# Patient Record
Sex: FEMALE | Race: WHITE | URBAN - METROPOLITAN AREA
[De-identification: names, ages, dates, MRNs, and addresses within clinical notes are randomized per-mention and may not be internally consistent; named-entity substitution may affect disease eponyms.]

---

## 2017-01-05 DIAGNOSIS — Z13.9 SCREENING FOR CONDITION: Primary | ICD-10-CM

## 2017-01-06 DIAGNOSIS — Z13.9 SCREENING FOR CONDITION: ICD-10-CM

## 2017-01-06 DIAGNOSIS — G40.909 UNCLASSIFIED EPILEPTIC SEIZURES (H): Primary | ICD-10-CM

## 2017-01-06 LAB
ALBUMIN UR-MCNC: NEGATIVE MG/DL
APPEARANCE UR: CLEAR
BASOPHILS # BLD AUTO: 0 10E9/L (ref 0–0.2)
BASOPHILS NFR BLD AUTO: 0.1 %
BILIRUB UR QL STRIP: NEGATIVE
COLOR UR AUTO: YELLOW
DIFFERENTIAL METHOD BLD: NORMAL
EOSINOPHIL # BLD AUTO: 0.1 10E9/L (ref 0–0.7)
EOSINOPHIL NFR BLD AUTO: 0.9 %
ERYTHROCYTE [DISTWIDTH] IN BLOOD BY AUTOMATED COUNT: 13.3 % (ref 10–15)
GLUCOSE UR STRIP-MCNC: NEGATIVE MG/DL
HCT VFR BLD AUTO: 36 % (ref 35–47)
HGB BLD-MCNC: 12.5 G/DL (ref 11.7–15.7)
HGB UR QL STRIP: NEGATIVE
KETONES UR STRIP-MCNC: NEGATIVE MG/DL
LEUKOCYTE ESTERASE UR QL STRIP: NEGATIVE
LYMPHOCYTES # BLD AUTO: 1.6 10E9/L (ref 0.8–5.3)
LYMPHOCYTES NFR BLD AUTO: 19.9 %
MCH RBC QN AUTO: 32.7 PG (ref 26.5–33)
MCHC RBC AUTO-ENTMCNC: 34.7 G/DL (ref 31.5–36.5)
MCV RBC AUTO: 94 FL (ref 78–100)
MONOCYTES # BLD AUTO: 0.4 10E9/L (ref 0–1.3)
MONOCYTES NFR BLD AUTO: 5.6 %
NEUTROPHILS # BLD AUTO: 5.8 10E9/L (ref 1.6–8.3)
NEUTROPHILS NFR BLD AUTO: 73.5 %
NITRATE UR QL: NEGATIVE
NON-SQ EPI CELLS #/AREA URNS LPF: NORMAL /LPF
PH UR STRIP: 7 PH (ref 5–7)
PLATELET # BLD AUTO: 235 10E9/L (ref 150–450)
RBC # BLD AUTO: 3.82 10E12/L (ref 3.8–5.2)
RBC #/AREA URNS AUTO: NORMAL /HPF (ref 0–2)
SP GR UR STRIP: 1.01 (ref 1–1.03)
URN SPEC COLLECT METH UR: NORMAL
UROBILINOGEN UR STRIP-ACNC: 0.2 EU/DL (ref 0.2–1)
WBC # BLD AUTO: 7.9 10E9/L (ref 4–11)
WBC #/AREA URNS AUTO: NORMAL /HPF (ref 0–2)

## 2017-01-06 PROCEDURE — 36415 COLL VENOUS BLD VENIPUNCTURE: CPT | Performed by: INTERNAL MEDICINE

## 2017-01-06 PROCEDURE — 86780 TREPONEMA PALLIDUM: CPT | Mod: 90 | Performed by: INTERNAL MEDICINE

## 2017-01-06 PROCEDURE — 87086 URINE CULTURE/COLONY COUNT: CPT | Performed by: INTERNAL MEDICINE

## 2017-01-06 PROCEDURE — 87340 HEPATITIS B SURFACE AG IA: CPT | Mod: 90 | Performed by: INTERNAL MEDICINE

## 2017-01-06 PROCEDURE — 85025 COMPLETE CBC W/AUTO DIFF WBC: CPT | Performed by: INTERNAL MEDICINE

## 2017-01-06 PROCEDURE — 80175 DRUG SCREEN QUAN LAMOTRIGINE: CPT | Mod: 90 | Performed by: INTERNAL MEDICINE

## 2017-01-06 PROCEDURE — 99000 SPECIMEN HANDLING OFFICE-LAB: CPT | Performed by: INTERNAL MEDICINE

## 2017-01-06 PROCEDURE — 86900 BLOOD TYPING SEROLOGIC ABO: CPT | Performed by: INTERNAL MEDICINE

## 2017-01-06 PROCEDURE — 86901 BLOOD TYPING SEROLOGIC RH(D): CPT | Performed by: INTERNAL MEDICINE

## 2017-01-06 PROCEDURE — 86850 RBC ANTIBODY SCREEN: CPT | Performed by: INTERNAL MEDICINE

## 2017-01-06 PROCEDURE — 86762 RUBELLA ANTIBODY: CPT | Mod: 90 | Performed by: INTERNAL MEDICINE

## 2017-01-06 PROCEDURE — 81001 URINALYSIS AUTO W/SCOPE: CPT | Performed by: INTERNAL MEDICINE

## 2017-01-06 PROCEDURE — 82105 ALPHA-FETOPROTEIN SERUM: CPT | Mod: 90 | Performed by: INTERNAL MEDICINE

## 2017-01-06 PROCEDURE — 87389 HIV-1 AG W/HIV-1&-2 AB AG IA: CPT | Mod: 90 | Performed by: INTERNAL MEDICINE

## 2017-01-06 PROCEDURE — 82306 VITAMIN D 25 HYDROXY: CPT | Mod: 90 | Performed by: INTERNAL MEDICINE

## 2017-01-07 LAB — T PALLIDUM IGG+IGM SER QL: NEGATIVE

## 2017-01-08 LAB
BACTERIA SPEC CULT: NORMAL
MICRO REPORT STATUS: NORMAL
SPECIMEN SOURCE: NORMAL

## 2017-01-09 LAB
# FETUSES US: NORMAL
ABO + RH BLD: NORMAL
ABO + RH BLD: NORMAL
AFP ADJ MOM AMN: 1.43
AFP SERPL-MCNC: 103 NG/ML
AGE - REPORTED: 30.9
BLD GP AB SCN SERPL QL: NORMAL
BLOOD BANK CMNT PATIENT-IMP: NORMAL
DATING METHOD: NORMAL
DEPRECATED CALCIDIOL+CALCIFEROL SERPL-MC: 30 UG/L (ref 20–75)
DIABETIC AT CONCEPTION: NO
FAMILY MEMBER DISEASES HX: NO
FAMILY MEMBER DISEASES HX: NO
GA METHOD: NORMAL
GA: 20.57 WK
HBV SURFACE AG SERPL QL IA: NONREACTIVE
HIV 1+2 AB+HIV1 P24 AG SERPL QL IA: NORMAL
HX OF HEREDITARY DISORDERS: NO
IDDM PATIENT QL: NO
INTEGRATED SCN PATIENT-IMP: NORMAL
LMP START DATE: NORMAL
PREV HX CHROMOSOME ABNORMALITY: NO
SPECIMEN DRAWN SERPL: NORMAL
SPECIMEN EXP DATE BLD: NORMAL
TWINS: NO

## 2017-01-10 LAB
LAMOTRIGINE SERPL-MCNC: 1.6 UG/ML
RUBV IGG SERPL IA-ACNC: 36 IU/ML

## 2017-01-11 ENCOUNTER — PRE VISIT (OUTPATIENT)
Dept: MATERNAL FETAL MEDICINE | Facility: CLINIC | Age: 32
End: 2017-01-11

## 2017-01-16 ENCOUNTER — OFFICE VISIT (OUTPATIENT)
Dept: OBGYN | Facility: CLINIC | Age: 32
End: 2017-01-16
Attending: OBSTETRICS & GYNECOLOGY
Payer: COMMERCIAL

## 2017-01-16 ENCOUNTER — HOSPITAL ENCOUNTER (OUTPATIENT)
Dept: ULTRASOUND IMAGING | Facility: CLINIC | Age: 32
Discharge: HOME | End: 2017-01-16
Attending: OBSTETRICS & GYNECOLOGY | Admitting: OBSTETRICS & GYNECOLOGY
Payer: COMMERCIAL

## 2017-01-16 ENCOUNTER — OFFICE VISIT (OUTPATIENT)
Dept: MATERNAL FETAL MEDICINE | Facility: CLINIC | Age: 32
End: 2017-01-16
Attending: OBSTETRICS & GYNECOLOGY
Payer: COMMERCIAL

## 2017-01-16 VITALS
WEIGHT: 148 LBS | HEART RATE: 80 BPM | SYSTOLIC BLOOD PRESSURE: 95 MMHG | DIASTOLIC BLOOD PRESSURE: 66 MMHG | BODY MASS INDEX: 23.9 KG/M2

## 2017-01-16 DIAGNOSIS — O26.90 PREGNANCY RELATED CONDITION, UNSPECIFIED TRIMESTER: ICD-10-CM

## 2017-01-16 DIAGNOSIS — O09.622 HIGH RISK PREGNANCY IN YOUNG MULTIGRAVIDA, SECOND TRIMESTER: Primary | ICD-10-CM

## 2017-01-16 DIAGNOSIS — O99.352 SEIZURE DISORDER DURING PREGNANCY IN SECOND TRIMESTER (H): Primary | ICD-10-CM

## 2017-01-16 DIAGNOSIS — G40.909 SEIZURE DISORDER DURING PREGNANCY IN SECOND TRIMESTER (H): Primary | ICD-10-CM

## 2017-01-16 PROCEDURE — 99212 OFFICE O/P EST SF 10 MIN: CPT | Mod: ZF

## 2017-01-16 PROCEDURE — 76811 OB US DETAILED SNGL FETUS: CPT

## 2017-01-16 NOTE — PROGRESS NOTES
"Please see \"Imaging\" tab under \"Chart Review\" for details of today's US.    Hernesto Cristina    "

## 2017-01-16 NOTE — NURSING NOTE
Chief Complaint   Patient presents with     Prenatal Care     22w0d       See ADEEL Farnsworth 1/16/2017

## 2017-01-16 NOTE — Clinical Note
2017       RE: Jill Regan  [address]  [facility] [address]     Dear Colleague,    Thank you for referring your patient, Jill Regan, to the [facility] at [facility]. Please see a copy of my visit note below.    Doing well. U/s today reassuring at [facility].   Reviewed OB labs and all wnl.      Has seizure d/o on meds and levels were recently checked by neuro team- she has not heard re: any changes to dose.   No recent seizures. Denies HA.     A/p: 30 yo  at 22 wks, w/ seizure d/o on 2 meds.   1. F/u us scheduled per [facility] team recs w/ growth at 28 and 34 wks GA.   2. F/u with neuro team w/ med levels regularly and post partum as well   3. Folic acid 2000 BID   4. S/p flu shot  5. GCT/cbc and RPR at next visit.     Lubna Rajput MD, FACOG  [facility]  OB/GYN    2017  9:47 AM

## 2017-01-18 PROBLEM — G40.909 SEIZURE DISORDER (H): Status: ACTIVE | Noted: 2017-01-18

## 2017-01-18 NOTE — PROGRESS NOTES
Doing well. U/s today reassuring at [facility].   Reviewed OB labs and all wnl.      Has seizure d/o on meds and levels were recently checked by neuro team- she has not heard re: any changes to dose.   No recent seizures. Denies HA.     A/p: 30 yo  at 22 wks, w/ seizure d/o on 2 meds.   1. F/u us scheduled per [facility] team recs w/ growth at 28 and 34 wks GA.   2. F/u with neuro team w/ med levels regularly and post partum as well   3. Folic acid 2000 BID   4. S/p flu shot  5. GCT/cbc and RPR at next visit.     Lubna Rajput MD, FACOG  [facility]  OB/GYN    2017  9:47 AM

## 2017-02-27 ENCOUNTER — OFFICE VISIT (OUTPATIENT)
Dept: OBGYN | Facility: CLINIC | Age: 32
End: 2017-02-27
Attending: OBSTETRICS & GYNECOLOGY
Payer: COMMERCIAL

## 2017-02-27 VITALS
DIASTOLIC BLOOD PRESSURE: 69 MMHG | SYSTOLIC BLOOD PRESSURE: 108 MMHG | BODY MASS INDEX: 24.97 KG/M2 | HEART RATE: 91 BPM | WEIGHT: 154.7 LBS

## 2017-02-27 DIAGNOSIS — Z32.01 PREGNANCY TEST POSITIVE: ICD-10-CM

## 2017-02-27 DIAGNOSIS — O09.93 HRP (HIGH RISK PREGNANCY), THIRD TRIMESTER: Primary | ICD-10-CM

## 2017-02-27 PROCEDURE — 99212 OFFICE O/P EST SF 10 MIN: CPT | Mod: ZF

## 2017-02-27 ASSESSMENT — PAIN SCALES - GENERAL: PAINLEVEL: NO PAIN (0)

## 2017-02-27 NOTE — MR AVS SNAPSHOT
After Visit Summary   2/27/2017    Jill Regan    MRN: [id number]           Patient Information     Date Of Birth          1985        Visit Information        Provider Department      2/27/2017 3:45 PM Lubna Rajput MD [facility]        Today's Diagnoses     HRP (high risk pregnancy), third trimester    -  1    Pregnancy test positive           Follow-ups after your visit        Follow-up notes from your care team     Return in about 2 weeks (around 3/13/2017) for ANA MARÍA.      Future tests that were ordered for you today     Open Future Orders        Priority Expected Expires Ordered    Growth Ultrasound 84026 Routine 5/29/2017 6/28/2017 2/28/2017    25- OH-Vitamin D Routine  2/27/2018 2/27/2017    CBC with Platelets Routine  2/27/2018 2/27/2017    Anti Treponema Routine  2/27/2018 2/27/2017    Glucose 1 Hour Routine  2/27/2018 2/27/2017            Who to contact     Please call your clinic at [phone] to:    Ask questions about your health    Make or cancel appointments    Discuss your medicines    Learn about your test results    Speak to your doctor   If you have compliments or concerns about an experience at your clinic, or if you wish to file a complaint, please contact [facility] Patient Relations at [phone] or email us at [email or web address]         Additional Information About Your Visit        MyChart Information     [employer] is an electronic gateway that provides easy, online access to your medical records. With eSolar, you can request a clinic appointment, read your test results, renew a prescription or communicate with your care team.     To sign up for [employer] visit the website at [email or web address]   You will be asked to enter the access code listed below, as well as some personal information. Please follow the directions to create your username and password.     Your access code is:  [email or web address]  Expires: 2017 11:31 AM     Your access code will  in 90 days. If you need help or a new code, please contact your [facility] or call [phone] for assistance.        Care EveryWhere ID     This is your Care EveryWhere ID. This could be used by other organizations to access your [place] medical records  [id number]        Your Vitals Were     Pulse Breastfeeding? BMI (Body Mass Index)             91 No 24.97 kg/m2          Blood Pressure from Last 3 Encounters:   17 108/69   17 95/66   16 108/73    Weight from Last 3 Encounters:   17 70.2 kg (154 lb 11.2 oz)   17 67.1 kg (148 lb)   16 63.6 kg (140 lb 3.2 oz)               Primary Care Provider Office Phone # Fax #    Jelena Paiz [phone]       [place] [address]        Thank you!     Thank you for choosing [facility]  for your care. Our goal is always to provide you with excellent care. Hearing back from our patients is one way we can continue to improve our services. Please take a few minutes to complete the written survey that you may receive in the mail after your visit with us. Thank you!             Your Updated Medication List - Protect others around you: Learn how to safely use, store and throw away your medicines at www.disposemymeds.org.          This list is accurate as of: 17 11:59 PM.  Always use your most recent med list.                   Brand Name Dispense Instructions for use    FOLIC ACID PO      Take 2,000 mcg by mouth 2 times daily       KEPPRA PO      Take 1,500 mg by mouth 2 times daily       LAMICTAL PO      Take 100 mg by mouth 2 times daily       PRENATAL VITAMIN PO

## 2017-02-27 NOTE — LETTER
2017       RE: Jill Regan  [address]  [facility] [address]     Dear Colleague,    Thank you for referring your patient, Jill Regan, to the [facility] at [facility]. Please see a copy of my visit note below.    S: Jill Regan is a 30yo  at 28w0d presenting for OB follow-up visit. Feels things are going well, though notes continued struggles with dry skin on her face as well as varicose veins involving the left vulva and upper leg. Does note starting a new face wash a few weeks ago when rash emerged.    Is continuing to wear pressure tights for varicose veins and using pregnancy pillow to try to reduce pain. Otherwise no concerns, feels well, denies contractions, loss of fluid or vaginal bleeding, headache, vision changes, nausea or vomiting, swelling, RUQ pain, SOB.     Denies any seizure symptoms, feels well controlled on current regimen - at recent visit with neurology noted levels were on low side but they were happy with them, regimen was not changed.    Notes she delivered both her children at around 38 weeks - the first she went into labor but was augmented, and the second was induced. States this was to help maintain a consistent sleep schedule for management of her seizure disorder. Is hoping to do the same with this pregnancy.    O: /69, P 91, weight 70.2kg. Well appearing. RRR with normal S1 and S2, lungs CTAB. Gravid abdomen, size consistent with 28w gestation. Skin of face including both cheeks and some forehead pink with areas of dry-appearing, peeling skin. Varicose veins noted involving left vulva and left upper thigh.     A/P: 30 yo  at 28 wks. H/o seizure disorder on 2 meds.     Growth u/s rec'd per MFM 28 and 34 wks.  Pt still needs to scheduled 28 wk scan.   Obtain third trimester labs including GCT in the next 1-2 weeks- as she is unable to complete today.  Discussed options for facial  dryness and irritation including Vanicream, coconut oil, aloe.   Continue use of pressure tights, instructed not to shave or wax over varicosities- as likely will increase irritation  IOL scheduled on 5/12/17 at 38+4 to help plan for early morning start of IOL and to help minimize sleep disruption that triggers her seizures.       Note prepared by Danuta Kuo, MS4, under the supervision of Dr. Lubna Rajput MD.    I agree with the PFSH and ROS as completed by the MS, except for changes made by me. The remainder of the encounter was performed by me and scribed by the MS. The scribed note accurately reflects my personal services and the decisions made by me.    Lubna Rajput MD, FACOG  [facility]  OB/GYN    2/28/2017  10:49 AM

## 2017-02-27 NOTE — PROGRESS NOTES
S: Jill Regan is a 30yo  at 28w0d presenting for OB follow-up visit. Feels things are going well, though notes continued struggles with dry skin on her face as well as varicose veins involving the left vulva and upper leg. Does note starting a new face wash a few weeks ago when rash emerged.    Is continuing to wear pressure tights for varicose veins and using pregnancy pillow to try to reduce pain. Otherwise no concerns, feels well, denies contractions, loss of fluid or vaginal bleeding, headache, vision changes, nausea or vomiting, swelling, RUQ pain, SOB.     Denies any seizure symptoms, feels well controlled on current regimen - at recent visit with neurology noted levels were on low side but they were happy with them, regimen was not changed.    Notes she delivered both her children at around 38 weeks - the first she went into labor but was augmented, and the second was induced. States this was to help maintain a consistent sleep schedule for management of her seizure disorder. Is hoping to do the same with this pregnancy.    O: /69, P 91, weight 70.2kg. Well appearing. RRR with normal S1 and S2, lungs CTAB. Gravid abdomen, size consistent with 28w gestation. Skin of face including both cheeks and some forehead pink with areas of dry-appearing, peeling skin. Varicose veins noted involving left vulva and left upper thigh.     A/P: 32 yo  at 28 wks. H/o seizure disorder on 2 meds.     Growth u/s rec'd per MFM 28 and 34 wks.  Pt still needs to scheduled 28 wk scan.   Obtain third trimester labs including GCT in the next 1-2 weeks- as she is unable to complete today.  Discussed options for facial dryness and irritation including Vanicream, coconut oil, aloe.   Continue use of pressure tights, instructed not to shave or wax over varicosities- as likely will increase irritation  IOL scheduled on 17 at 38+4 to help plan for early morning start of IOL and to help minimize sleep disruption  that triggers her seizures.       Note prepared by Danuta Kuo, MS4, under the supervision of Dr. Lubna Rajput MD.    I agree with the PFSH and ROS as completed by the MS, except for changes made by me. The remainder of the encounter was performed by me and scribed by the MS. The scribed note accurately reflects my personal services and the decisions made by me.    Lubna Rajput MD, FACOG  [facility]  OB/GYN    2/28/2017  10:49 AM

## 2017-03-09 ENCOUNTER — OFFICE VISIT (OUTPATIENT)
Dept: OBGYN | Facility: CLINIC | Age: 32
End: 2017-03-09
Attending: OBSTETRICS & GYNECOLOGY
Payer: COMMERCIAL

## 2017-03-09 DIAGNOSIS — Z32.01 PREGNANCY TEST POSITIVE: ICD-10-CM

## 2017-03-09 DIAGNOSIS — O09.93 HRP (HIGH RISK PREGNANCY), THIRD TRIMESTER: ICD-10-CM

## 2017-03-09 LAB
DEPRECATED CALCIDIOL+CALCIFEROL SERPL-MC: 36 UG/L (ref 20–75)
ERYTHROCYTE [DISTWIDTH] IN BLOOD BY AUTOMATED COUNT: 13.2 % (ref 10–15)
GLUCOSE 1H P 50 G GLC PO SERPL-MCNC: 76 MG/DL (ref 60–129)
HCT VFR BLD AUTO: 35.5 % (ref 35–47)
HGB BLD-MCNC: 11.9 G/DL (ref 11.7–15.7)
MCH RBC QN AUTO: 32.2 PG (ref 26.5–33)
MCHC RBC AUTO-ENTMCNC: 33.5 G/DL (ref 31.5–36.5)
MCV RBC AUTO: 96 FL (ref 78–100)
PLATELET # BLD AUTO: 203 10E9/L (ref 150–450)
RBC # BLD AUTO: 3.7 10E12/L (ref 3.8–5.2)
WBC # BLD AUTO: 8.6 10E9/L (ref 4–11)

## 2017-03-09 PROCEDURE — 82306 VITAMIN D 25 HYDROXY: CPT | Performed by: OBSTETRICS & GYNECOLOGY

## 2017-03-09 PROCEDURE — 36415 COLL VENOUS BLD VENIPUNCTURE: CPT | Performed by: OBSTETRICS & GYNECOLOGY

## 2017-03-09 PROCEDURE — 76816 OB US FOLLOW-UP PER FETUS: CPT | Mod: ZF

## 2017-03-09 PROCEDURE — 85027 COMPLETE CBC AUTOMATED: CPT | Performed by: OBSTETRICS & GYNECOLOGY

## 2017-03-09 PROCEDURE — 82950 GLUCOSE TEST: CPT | Performed by: OBSTETRICS & GYNECOLOGY

## 2017-03-09 PROCEDURE — 86780 TREPONEMA PALLIDUM: CPT | Performed by: OBSTETRICS & GYNECOLOGY

## 2017-03-09 NOTE — LETTER
3/9/2017       RE: Jill Regan  [address] [facility] [address]     Dear Colleague,    Thank you for referring your patient, Jill Regan, to the [facility] at [facility]. Please see a copy of my visit note below.    31 year old female, , presents at 29 2/7 weeks for obstetric ultrasound assessment indicated by h/o seizure disorder.    Single fetus    Presentation - cephalic    USEGA = 29 6/7 weeks.  EFW = 1,516 grams, 63 % for 29 weeks.      YARITZA = 11.7cm.  FHR = 138bpm     Placenta anterior and grade 0    Comments: Appropriate interval growth    Findings discussed with patient.    Further studies as clinically indicated.      MIRTA Wallace MD

## 2017-03-09 NOTE — MR AVS SNAPSHOT
After Visit Summary   3/9/2017    Jill Regan    MRN: [id number]           Patient Information     Date Of Birth          1985        Visit Information        Provider Department      3/9/2017 10:30 AM [facility] ULTRASOUND II [facility]        Today's Diagnoses     HRP (high risk pregnancy), third trimester           Follow-ups after your visit        Your next 10 appointments already scheduled     2017 10:30 AM CDT   ULTRASOUND with [facility] ULTRASOUND II   [facility] ([facility])    [facility] [address]  [address]  [facility] [id number] [phone]              Who to contact     Please call your clinic at [phone] to:    Ask questions about your health    Make or cancel appointments    Discuss your medicines    Learn about your test results    Speak to your doctor   If you have compliments or concerns about an experience at your clinic, or if you wish to file a complaint, please contact [facility] Patient Relations at [phone] or email us at [email or web address]         Additional Information About Your Visit        MyChart Information     Vimodi is an electronic gateway that provides easy, online access to your medical records. With Vimodi, you can request a clinic appointment, read your test results, renew a prescription or communicate with your care team.     To sign up for Vimodi visit the website at [email or web address]   You will be asked to enter the access code listed below, as well as some personal information. Please follow the directions to create your username and password.     Your access code is: [email or web address]  Expires: 2017 11:31 AM     Your access code will  in 90 days. If you need help or a new code, please contact your [facility] or call [phone] for assistance.        Care  EveryWhere ID     This is your Care EveryWhere ID. This could be used by other organizations to access your [place] medical records  [id number]         Blood Pressure from Last 3 Encounters:   02/27/17 108/69   01/16/17 95/66   12/09/16 108/73    Weight from Last 3 Encounters:   02/27/17 70.2 kg (154 lb 11.2 oz)   01/16/17 67.1 kg (148 lb)   12/09/16 63.6 kg (140 lb 3.2 oz)              We Performed the Following     Growth Ultrasound [id number]        Primary Care Provider Office Phone # Fax #    Jelena Paiz [phone]       [facility] [address] [facility] [facility] [address]        Thank you!     Thank you for choosing [facility]  for your care. Our goal is always to provide you with excellent care. Hearing back from our patients is one way we can continue to improve our services. Please take a few minutes to complete the written survey that you may receive in the mail after your visit with us. Thank you!             Your Updated Medication List - Protect others around you: Learn how to safely use, store and throw away your medicines at www.disposemymeds.org.          This list is accurate as of: 3/9/17  4:27 PM.  Always use your most recent med list.                   Brand Name Dispense Instructions for use    FOLIC ACID PO      Take 2,000 mcg by mouth 2 times daily       KEPPRA PO      Take 1,500 mg by mouth 2 times daily       LAMICTAL PO      Take 100 mg by mouth 2 times daily       PRENATAL VITAMIN PO

## 2017-03-10 LAB — T PALLIDUM IGG+IGM SER QL: NEGATIVE

## 2017-04-13 ENCOUNTER — OFFICE VISIT (OUTPATIENT)
Dept: OBGYN | Facility: CLINIC | Age: 32
End: 2017-04-13
Attending: OBSTETRICS & GYNECOLOGY
Payer: COMMERCIAL

## 2017-04-13 DIAGNOSIS — G40.909 SEIZURE DISORDER (H): Primary | ICD-10-CM

## 2017-04-13 PROCEDURE — 76816 OB US FOLLOW-UP PER FETUS: CPT | Mod: ZF

## 2017-04-13 NOTE — MR AVS SNAPSHOT
After Visit Summary   4/13/2017    Jill Regan    MRN: [id number]           Patient Information     Date Of Birth          1985        Visit Information        Provider Department      4/13/2017 10:30 AM [facility] ULTRASOUND II [facility]        Today's Diagnoses     Seizure disorder (H)    -  1       Follow-ups after your visit        Your next 10 appointments already scheduled     Apr 20, 2017 10:45 AM CDT   RETURN OB with CARY Richardson CN   [facility] ([facility])    [facility] [address]  [address]  [facility] [address] [phone]            Apr 26, 2017 10:00 AM CDT   RETURN OB with CARY Houston   [facility] ([facility])    [facility] [address]  [address]  [facility] [address] [phone]            May 02, 2017 10:30 AM CDT   RETURN OB with Ana Paula Hooks MD   [facility] ([facility])    [facility] [address]  [address]  [facility] [address] [phone]            May 11, 2017  2:00 PM CDT   RETURN OB with Angie Pedraza MD   [facility] ([facility])    [facility] [address]  [address]  [facility] [id number] [phone]              Who to contact     Please call your clinic at [phone] to:    Ask questions about your health    Make or cancel appointments    Discuss your medicines    Learn about your test results    Speak to your doctor   If you have compliments or concerns about an experience at your clinic, or if you wish to file a complaint, please contact [facility] Patient Relations at [phone] or email us at [email or web address]         Additional Information About Your Visit        MyChart Information      HealthW[employer]house[employer] is an electronic gateway that provides easy, online access to your medical records. With [email or web address], you can request a clinic appointment, read your test results, renew a prescription or communicate with your care team.     To sign up for [email or web address] visit the website at [email or web address]   You will be asked to enter the access code listed below, as well as some personal information. Please follow the directions to create your username and password.     Your access code is: [email or web address]  Expires: 2017 12:31 PM     Your access code will  in 90 days. If you need help or a new code, please contact your [facility] or call [phone] for assistance.        Care EveryWhere ID     This is your Care EveryWhere ID. This could be used by other organizations to access your [place] medical records  [id number]         Blood Pressure from Last 3 Encounters:   17 108/69   17 95/66   16 108/73    Weight from Last 3 Encounters:   17 70.2 kg (154 lb 11.2 oz)   17 67.1 kg (148 lb)   16 63.6 kg (140 lb 3.2 oz)              We Performed the Following     Growth Ultrasound [address]        Primary Care Provider Office Phone # Fax #    Jelena Paiz [phone]       [place] [address] [address] [address]        Thank you!     Thank you for choosing [facility]  for your care. Our goal is always to provide you with excellent care. Hearing back from our patients is one way we can continue to improve our services. Please take a few minutes to complete the written survey that you may receive in the mail after your visit with us. Thank you!             Your Updated Medication List - Protect others around you: Learn how to safely use, store and throw away your medicines at www.disposemymeds.org.          This list is accurate as of: 17 11:26 AM.  Always use your most recent med list.                    Brand Name Dispense Instructions for use    FOLIC ACID PO      Take 2,000 mcg by mouth 2 times daily       KEPPRA PO      Take 1,500 mg by mouth 2 times daily       LAMICTAL PO      Take 100 mg by mouth 2 times daily       PRENATAL VITAMIN PO

## 2017-04-13 NOTE — LETTER
2017       RE: Jill Regan  [address] [facility] [address]     Dear Colleague,    Thank you for referring your patient, Jill Regan, to the [facility] at [facility]. Please see a copy of my visit note below.    31 year old female, ,  presents at 34 3/7 weeks for obstetric ultrasound assessment indicated by h/o seizure disorder.    Single fetus    Presentation - cephalic    USEGA = 34 1/7 weeks.  EFW = 2,354 grams, 35 % for 34 weeks.      YARITZA = 9.7cm.  FHR = 135bpm     Placenta anterior and grade 0    Comments: Appropriate growth for gestational age    Findings discussed with patient.    Further studies as clinically indicated.    MIRTA Wallace MD

## 2017-04-20 ENCOUNTER — OFFICE VISIT (OUTPATIENT)
Dept: OBGYN | Facility: CLINIC | Age: 32
End: 2017-04-20
Attending: ADVANCED PRACTICE MIDWIFE
Payer: COMMERCIAL

## 2017-04-20 VITALS
WEIGHT: 159 LBS | SYSTOLIC BLOOD PRESSURE: 101 MMHG | DIASTOLIC BLOOD PRESSURE: 65 MMHG | HEIGHT: 66 IN | HEART RATE: 90 BPM | BODY MASS INDEX: 25.55 KG/M2

## 2017-04-20 DIAGNOSIS — G40.909 SEIZURE DISORDER (H): ICD-10-CM

## 2017-04-20 DIAGNOSIS — O09.93 HRP (HIGH RISK PREGNANCY), THIRD TRIMESTER: Primary | ICD-10-CM

## 2017-04-20 PROCEDURE — 99211 OFF/OP EST MAY X REQ PHY/QHP: CPT | Mod: ZF

## 2017-04-20 PROCEDURE — 87653 STREP B DNA AMP PROBE: CPT | Performed by: ADVANCED PRACTICE MIDWIFE

## 2017-04-20 ASSESSMENT — PAIN SCALES - GENERAL: PAINLEVEL: NO PAIN (0)

## 2017-04-20 NOTE — LETTER
"2017       RE: Jill Regan  [address] [facility] [address]     Dear Colleague,    Thank you for referring your patient, Jill Regan, to the [facility] at [facility]. Please see a copy of my visit note below.    Subjective:      31 year old  at 35w5d presents for a routine prenatal appointment.         no vaginal bleeding,  leakage of fluid, or change in vaginal discharge.  no contractions.  pos fetal movement.     No HA, visual changes, RUQ or epigastric pain.   Patient concerns: none, wanting to make sure IOL is scheduled .   Feeling well overall.   Objective:  Vitals:    17 1105   BP: 101/65   BP Location: Left arm   Patient Position: Chair   Cuff Size: Adult Regular   Pulse: 90   Weight: 72.1 kg (159 lb)   Height: 1.676 m (5' 5.98\")    See OB flowsheet    Assessment/Plan     Encounter Diagnoses   Name Primary?     HRP (high risk pregnancy), third trimester Yes     Seizure disorder (H)      No orders of the defined types were placed in this encounter.    No orders of the defined types were placed in this encounter.      No flowsheet data found.  Growth u/s is reviewed, 34% EFW, appropriate growth per report  GBS screening: Obtained.  Birth preferences reviewed: Medicated  Labor signs discussed. Reinforced daily fetal movement counts.  Reviewed why/how to contact provider if headache/visual changes/RUQ or epigastric pain, decreased fetal movement, vaginal bleeding, leakage of fluid.   Return to clinic in 1 week and prn if questions or concerns.     Heather Singleton, APRN CNM        "

## 2017-04-20 NOTE — MR AVS SNAPSHOT
After Visit Summary   4/20/2017    Jill Regan    MRN: [id number]           Patient Information     Date Of Birth          1985        Visit Information        Provider Department      4/20/2017 10:45 AM Heather Singleton APRN [facility]        Today's Diagnoses     HRP (high risk pregnancy), third trimester    -  1    Seizure disorder (H)           Follow-ups after your visit        Follow-up notes from your care team     Return in about 1 week (around 4/27/2017) for bubba.      Your next 10 appointments already scheduled     Apr 26, 2017 10:00 AM CDT   RETURN OB with CARY Houston CNM   [facility] ([facility])    [facility] [facility]  [address]  [facility] [id number] [phone]            May 02, 2017 10:30 AM CDT   RETURN OB with Ana Paula Hooks MD   [facility] ([facility])    [facility] [facility] [address]  [address] [facility] [id number] [phone]            May 11, 2017  2:00 PM CDT   RETURN OB with Angie Pedraza MD   [facility] ([facility])    [place] [facility] [address]  [address]  [facility] [id number] [phone]              Who to contact     Please call your clinic at [phone] to:    Ask questions about your health    Make or cancel appointments    Discuss your medicines    Learn about your test results    Speak to your doctor   If you have compliments or concerns about an experience at your clinic, or if you wish to file a complaint, please contact [facility] Patient Relations at [phone] or email us at [email or web address]         Additional Information About Your Visit        MyChart Information     Spotwiset is an electronic gateway that provides easy, online access to your medical records.  "With Mtone Wirelesshart, you can request a clinic appointment, read your test results, renew a prescription or communicate with your care team.     To sign up for [employer] visit the website at [email or web address]   You will be asked to enter the access code listed below, as well as some personal information. Please follow the directions to create your username and password.     Your access code is: [email or web address]  Expires: 2017 12:31 PM     Your access code will  in 90 days. If you need help or a new code, please contact your [facility] or call [phone] for assistance.        Care EveryWhere ID     This is your Care EveryWhere ID. This could be used by other organizations to access your [place] medical records  [id number]        Your Vitals Were     Pulse Height BMI (Body Mass Index)             90 1.676 m (5' 5.98\") 25.68 kg/m2          Blood Pressure from Last 3 Encounters:   17 101/65   17 108/69   17 95/66    Weight from Last 3 Encounters:   17 72.1 kg (159 lb)   17 70.2 kg (154 lb 11.2 oz)   17 67.1 kg (148 lb)              We Performed the Following     Group B strep PCR        Primary Care Provider Office Phone # Fax #    Jelena Paiz [phone]       [place] [address] [address] [address]        Thank you!     Thank you for choosing [facility]  for your care. Our goal is always to provide you with excellent care. Hearing back from our patients is one way we can continue to improve our services. Please take a few minutes to complete the written survey that you may receive in the mail after your visit with us. Thank you!             Your Updated Medication List - Protect others around you: Learn how to safely use, store and throw away your medicines at www.disposemymeds.org.          This list is accurate as of: 17 11:59 PM.  Always use your most recent med list.       "             Brand Name Dispense Instructions for use    FOLIC ACID PO      Take 2,000 mcg by mouth 2 times daily       KEPPRA PO      Take 1,500 mg by mouth 2 times daily       LAMICTAL PO      Take 100 mg by mouth 2 times daily       PRENATAL VITAMIN PO

## 2017-04-21 LAB
GP B STREP DNA SPEC QL NAA+PROBE: NORMAL
SPECIMEN SOURCE: NORMAL

## 2017-04-22 NOTE — PROGRESS NOTES
"Subjective:      31 year old  at 35w5d presents for a routine prenatal appointment.         no vaginal bleeding,  leakage of fluid, or change in vaginal discharge.  no contractions.  pos fetal movement.     No HA, visual changes, RUQ or epigastric pain.   Patient concerns: none, wanting to make sure IOL is scheduled .   Feeling well overall.   Objective:  Vitals:    17 1105   BP: 101/65   BP Location: Left arm   Patient Position: Chair   Cuff Size: Adult Regular   Pulse: 90   Weight: 72.1 kg (159 lb)   Height: 1.676 m (5' 5.98\")    See OB flowsheet    Assessment/Plan     Encounter Diagnoses   Name Primary?     HRP (high risk pregnancy), third trimester Yes     Seizure disorder (H)      No orders of the defined types were placed in this encounter.    No orders of the defined types were placed in this encounter.      No flowsheet data found.  Growth u/s is reviewed, 34% EFW, appropriate growth per report  GBS screening: Obtained.  Birth preferences reviewed: Medicated  Labor signs discussed. Reinforced daily fetal movement counts.  Reviewed why/how to contact provider if headache/visual changes/RUQ or epigastric pain, decreased fetal movement, vaginal bleeding, leakage of fluid.   Return to clinic in 1 week and prn if questions or concerns.     Heather Singleton, APRN CNM  "

## 2017-04-23 ENCOUNTER — TELEPHONE (OUTPATIENT)
Dept: NURSING | Facility: CLINIC | Age: 32
End: 2017-04-23

## 2017-04-23 NOTE — TELEPHONE ENCOUNTER
"Call Type: Triage Call    Presenting Problem: \" I  am 35 weeks pregnant,  I was having  contractionsearlier in the week, but now I was  just sitting  down,then felt some wetness and it was moist, I don't know if my  water broke or not. Last night I had contractions again for 4 hours,  but didn't time them, because they weren't close enough together. I  have 2 other kids. \" Paged Dr. Medel for [facility]  through page  at 1:20pm directly to patient at [phone].    Triage Note:  Guideline Title: Pregnancy:  Labor, 20 to 37 Weeks  Recommended Disposition: Call Provider Immediately  Original Inclination: Wanted to speak with a nurse  Override Disposition:  Intended Action: Call PCP/HCP  Physician Contacted: No  Sudden gush or trickle of fluid from the vagina ?  YES  New or worsening signs and symptoms that may indicate shock ? NO  Gestation more than 37 weeks AND signs of labor ? NO  Gestation less than 20 weeks AND signs of labor ? NO  Feeling of baby coming or wanting to push (urge to bear down) ? NO  Unbearable abdominal/pelvic pain ? NO  Umbilical cord or any part of the baby (head, bottom, arm or leg) at the opening  of the vagina ? NO  Gush or leakage of green or green-tinged or port-wine colored fluid (reddish and  watery) from the vagina ? NO  No relief between contractions ? NO  Continuous bright red vaginal bleeding for more than 15 minutes (more than  spotting) ? NO  Gestation 20 weeks or more AND decreased fetal movement compared to previous  activity ? NO  Physician Instructions:  Care Advice: IMMEDIATE ACTION  "

## 2017-04-24 ENCOUNTER — TRANSFERRED RECORDS (OUTPATIENT)
Dept: HEALTH INFORMATION MANAGEMENT | Facility: CLINIC | Age: 32
End: 2017-04-24

## 2017-05-02 ENCOUNTER — OFFICE VISIT (OUTPATIENT)
Dept: OBGYN | Facility: CLINIC | Age: 32
End: 2017-05-02
Attending: OBSTETRICS & GYNECOLOGY
Payer: COMMERCIAL

## 2017-05-02 VITALS
HEIGHT: 66 IN | BODY MASS INDEX: 25.12 KG/M2 | WEIGHT: 156.3 LBS | SYSTOLIC BLOOD PRESSURE: 100 MMHG | HEART RATE: 90 BPM | DIASTOLIC BLOOD PRESSURE: 62 MMHG

## 2017-05-02 DIAGNOSIS — O09.93 HRP (HIGH RISK PREGNANCY), THIRD TRIMESTER: Primary | ICD-10-CM

## 2017-05-02 ASSESSMENT — PAIN SCALES - GENERAL: PAINLEVEL: NO PAIN (0)
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
## 2017-05-02 NOTE — NURSING NOTE
Chief Complaint   Patient presents with     Prenatal Care     ANA MARÍA 37 weeks and1 day   Keeley Snyder LPN

## 2017-05-02 NOTE — MR AVS SNAPSHOT
After Visit Summary   2017    Jill Regan    MRN: [id number]           Patient Information     Date Of Birth          1985        Visit Information        Provider Department      2017 10:30 AM Ana Paula Hooks MD [facility]        Today's Diagnoses     HRP (high risk pregnancy), third trimester    -  1       Follow-ups after your visit        Follow-up notes from your care team     Return in about 1 week (around 2017).      Your next 10 appointments already scheduled     May 11, 2017  2:00 PM CDT   RETURN OB with Angie Pedraza MD   [facility] ([facility])    [place] [facility] [address]  [address]  [address]  [facility] [address] [phone]              Who to contact     Please call your clinic at [phone] to:    Ask questions about your health    Make or cancel appointments    Discuss your medicines    Learn about your test results    Speak to your doctor   If you have compliments or concerns about an experience at your clinic, or if you wish to file a complaint, please contact [facility] Patient Relations at [phone] or email us at [email or web address]         Additional Information About Your Visit        MyChart Information     Integrated Ordering Systemst is an electronic gateway that provides easy, online access to your medical records. With fromAtoB, you can request a clinic appointment, read your test results, renew a prescription or communicate with your care team.     To sign up for Integrated Ordering Systemst visit the website at [email or web address]   You will be asked to enter the access code listed below, as well as some personal information. Please follow the directions to create your username and password.     Your access code is: [email or web address]  Expires: 2017 12:31 PM     Your access code will  in 90 days. If you need help or a new code, please contact your  "[facility] or call [phone] for assistance.        Care EveryWhere ID     This is your Care EveryWhere ID. This could be used by other organizations to access your [place] medical records  [id number]        Your Vitals Were     Pulse Height Breastfeeding? BMI (Body Mass Index)          90 1.676 m (5' 6\") No 25.23 kg/m2         Blood Pressure from Last 3 Encounters:   05/02/17 100/62   04/20/17 101/65   02/27/17 108/69    Weight from Last 3 Encounters:   05/02/17 70.9 kg (156 lb 4.8 oz)   04/20/17 72.1 kg (159 lb)   02/27/17 70.2 kg (154 lb 11.2 oz)              Today, you had the following     No orders found for display       Primary Care Provider Office Phone # Fax #    Jelena Paiz [phone]       [place] [address] [address] [address]        Thank you!     Thank you for choosing [facility]  for your care. Our goal is always to provide you with excellent care. Hearing back from our patients is one way we can continue to improve our services. Please take a few minutes to complete the written survey that you may receive in the mail after your visit with us. Thank you!             Your Updated Medication List - Protect others around you: Learn how to safely use, store and throw away your medicines at www.disposemymeds.org.          This list is accurate as of: 5/2/17  4:04 PM.  Always use your most recent med list.                   Brand Name Dispense Instructions for use    FOLIC ACID PO      Take 2,000 mcg by mouth 2 times daily       KEPPRA PO      Take 1,500 mg by mouth 2 times daily       LAMICTAL PO      Take 100 mg by mouth 2 times daily       PRENATAL VITAMIN PO            "
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
## 2017-05-11 ENCOUNTER — OFFICE VISIT (OUTPATIENT)
Dept: OBGYN | Facility: CLINIC | Age: 32
End: 2017-05-11
Attending: OBSTETRICS & GYNECOLOGY
Payer: COMMERCIAL

## 2017-05-11 VITALS — HEIGHT: 66 IN | WEIGHT: 159.9 LBS | BODY MASS INDEX: 25.7 KG/M2

## 2017-05-11 DIAGNOSIS — O09.93 HRP (HIGH RISK PREGNANCY), THIRD TRIMESTER: Primary | ICD-10-CM

## 2017-05-11 PROCEDURE — 99212 OFFICE O/P EST SF 10 MIN: CPT | Mod: ZF

## 2017-05-11 NOTE — PROGRESS NOTES
Patient Active Problem List   Diagnosis     Seizure disorder (H)     HRP (high risk pregnancy), third trimester     Angie Pedraza
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
## 2017-05-11 NOTE — MR AVS SNAPSHOT
"              After Visit Summary   2017    Jill Regan    MRN: [id number]           Patient Information     Date Of Birth          1985        Visit Information        Provider Department      2017 2:00 PM Angie Pedraza MD [facility]        Today's Diagnoses     HRP (high risk pregnancy), third trimester    -  1       Follow-ups after your visit        Who to contact     Please call your clinic at [phone] to:    Ask questions about your health    Make or cancel appointments    Discuss your medicines    Learn about your test results    Speak to your doctor   If you have compliments or concerns about an experience at your clinic, or if you wish to file a complaint, please contact [facility] Patient Relations at [phone] or email us at [email or web address]         Additional Information About Your Visit        MyChart Information     DailyLookt is an electronic gateway that provides easy, online access to your medical records. With Doppelgames, you can request a clinic appointment, read your test results, renew a prescription or communicate with your care team.     To sign up for DailyLookt visit the website at [email or web address]   You will be asked to enter the access code listed below, as well as some personal information. Please follow the directions to create your username and password.     Your access code is: [email or web address]  Expires: 2017 12:31 PM     Your access code will  in 90 days. If you need help or a new code, please contact your [facility] or call [phone] for assistance.        Care EveryWhere ID     This is your Care EveryWhere ID. This could be used by other organizations to access your [place] medical records  [id number]        Your Vitals Were     Height BMI (Body Mass Index)                1.676 m (5' 6\") 25.81 kg/m2           Blood Pressure from Last 3 " Encounters:   05/02/17 100/62   04/20/17 101/65   02/27/17 108/69    Weight from Last 3 Encounters:   05/11/17 72.5 kg (159 lb 14.4 oz)   05/02/17 70.9 kg (156 lb 4.8 oz)   04/20/17 72.1 kg (159 lb)              Today, you had the following     No orders found for display       Primary Care Provider Office Phone # Fax #    Jelena Paiz [phone]       [facility] [address] [address]        Thank you!     Thank you for choosing [facility]  for your care. Our goal is always to provide you with excellent care. Hearing back from our patients is one way we can continue to improve our services. Please take a few minutes to complete the written survey that you may receive in the mail after your visit with us. Thank you!             Your Updated Medication List - Protect others around you: Learn how to safely use, store and throw away your medicines at www.disposemymeds.org.          This list is accurate as of: 5/11/17  4:05 PM.  Always use your most recent med list.                   Brand Name Dispense Instructions for use    FOLIC ACID PO      Take 2,000 mcg by mouth 2 times daily       KEPPRA PO      Take 1,500 mg by mouth 2 times daily       LAMICTAL PO      Take 100 mg by mouth 2 times daily       PRENATAL VITAMIN PO

## 2017-05-12 ENCOUNTER — ANESTHESIA EVENT (OUTPATIENT)
Dept: OBGYN | Facility: CLINIC | Age: 32
End: 2017-05-12
Payer: COMMERCIAL

## 2017-05-12 ENCOUNTER — HOSPITAL ENCOUNTER (INPATIENT)
Facility: CLINIC | Age: 32
LOS: 2 days | Discharge: HOME | End: 2017-05-14
Attending: OBSTETRICS & GYNECOLOGY | Admitting: OBSTETRICS & GYNECOLOGY
Payer: COMMERCIAL

## 2017-05-12 ENCOUNTER — ANESTHESIA (OUTPATIENT)
Dept: OBGYN | Facility: CLINIC | Age: 32
End: 2017-05-12
Payer: COMMERCIAL

## 2017-05-12 DIAGNOSIS — G40.909 NONINTRACTABLE EPILEPSY WITHOUT STATUS EPILEPTICUS, UNSPECIFIED EPILEPSY TYPE (H): Primary | ICD-10-CM

## 2017-05-12 PROBLEM — Z34.90 PREGNANCY: Status: ACTIVE | Noted: 2017-05-12

## 2017-05-12 LAB
ABO + RH BLD: NORMAL
ABO + RH BLD: NORMAL
BLD GP AB SCN SERPL QL: NORMAL
BLOOD BANK CMNT PATIENT-IMP: NORMAL
ERYTHROCYTE [DISTWIDTH] IN BLOOD BY AUTOMATED COUNT: 13.2 % (ref 10–15)
HCT VFR BLD AUTO: 35 % (ref 35–47)
HGB BLD-MCNC: 12.1 G/DL (ref 11.7–15.7)
MCH RBC QN AUTO: 32.6 PG (ref 26.5–33)
MCHC RBC AUTO-ENTMCNC: 34.6 G/DL (ref 31.5–36.5)
MCV RBC AUTO: 94 FL (ref 78–100)
PLATELET # BLD AUTO: 224 10E9/L (ref 150–450)
RBC # BLD AUTO: 3.71 10E12/L (ref 3.8–5.2)
SPECIMEN EXP DATE BLD: NORMAL
T PALLIDUM IGG+IGM SER QL: NEGATIVE
WBC # BLD AUTO: 9.6 10E9/L (ref 4–11)

## 2017-05-12 PROCEDURE — 80177 DRUG SCRN QUAN LEVETIRACETAM: CPT | Performed by: OBSTETRICS & GYNECOLOGY

## 2017-05-12 PROCEDURE — 3E0R3CZ INTRODUCTION OF REGIONAL ANESTHETIC INTO SPINAL CANAL, PERCUTANEOUS APPROACH: ICD-10-PCS | Performed by: ANESTHESIOLOGY

## 2017-05-12 PROCEDURE — S0020 INJECTION, BUPIVICAINE HYDRO: HCPCS | Performed by: ANESTHESIOLOGY

## 2017-05-12 PROCEDURE — 86901 BLOOD TYPING SEROLOGIC RH(D): CPT | Performed by: OBSTETRICS & GYNECOLOGY

## 2017-05-12 PROCEDURE — 25000132 ZZH RX MED GY IP 250 OP 250 PS 637: Performed by: OBSTETRICS & GYNECOLOGY

## 2017-05-12 PROCEDURE — 00HU33Z INSERTION OF INFUSION DEVICE INTO SPINAL CANAL, PERCUTANEOUS APPROACH: ICD-10-PCS | Performed by: ANESTHESIOLOGY

## 2017-05-12 PROCEDURE — 36415 COLL VENOUS BLD VENIPUNCTURE: CPT | Performed by: OBSTETRICS & GYNECOLOGY

## 2017-05-12 PROCEDURE — 72200001 ZZH LABOR CARE VAGINAL DELIVERY SINGLE

## 2017-05-12 PROCEDURE — 86780 TREPONEMA PALLIDUM: CPT | Performed by: OBSTETRICS & GYNECOLOGY

## 2017-05-12 PROCEDURE — 25800025 ZZH RX 258: Performed by: OBSTETRICS & GYNECOLOGY

## 2017-05-12 PROCEDURE — 25000128 H RX IP 250 OP 636: Performed by: OBSTETRICS & GYNECOLOGY

## 2017-05-12 PROCEDURE — 10907ZC DRAINAGE OF AMNIOTIC FLUID, THERAPEUTIC FROM PRODUCTS OF CONCEPTION, VIA NATURAL OR ARTIFICIAL OPENING: ICD-10-PCS | Performed by: OBSTETRICS & GYNECOLOGY

## 2017-05-12 PROCEDURE — 86900 BLOOD TYPING SEROLOGIC ABO: CPT | Performed by: OBSTETRICS & GYNECOLOGY

## 2017-05-12 PROCEDURE — 80175 DRUG SCREEN QUAN LAMOTRIGINE: CPT | Performed by: OBSTETRICS & GYNECOLOGY

## 2017-05-12 PROCEDURE — 25000125 ZZHC RX 250: Performed by: ANESTHESIOLOGY

## 2017-05-12 PROCEDURE — 12000030 ZZH R&B OB INTERMEDIATE UMMC

## 2017-05-12 PROCEDURE — 25000125 ZZHC RX 250: Performed by: OBSTETRICS & GYNECOLOGY

## 2017-05-12 PROCEDURE — 86850 RBC ANTIBODY SCREEN: CPT | Performed by: OBSTETRICS & GYNECOLOGY

## 2017-05-12 PROCEDURE — S0191 MISOPROSTOL, ORAL, 200 MCG: HCPCS | Performed by: OBSTETRICS & GYNECOLOGY

## 2017-05-12 PROCEDURE — 25000125 ZZHC RX 250

## 2017-05-12 PROCEDURE — 85027 COMPLETE CBC AUTOMATED: CPT | Performed by: OBSTETRICS & GYNECOLOGY

## 2017-05-12 RX ORDER — OXYTOCIN/0.9 % SODIUM CHLORIDE 30/500 ML
100-340 PLASTIC BAG, INJECTION (ML) INTRAVENOUS CONTINUOUS PRN
Status: COMPLETED | OUTPATIENT
Start: 2017-05-12 | End: 2017-05-12

## 2017-05-12 RX ORDER — LIDOCAINE HYDROCHLORIDE AND EPINEPHRINE 15; 5 MG/ML; UG/ML
INJECTION, SOLUTION EPIDURAL PRN
Status: DISCONTINUED | OUTPATIENT
Start: 2017-05-12 | End: 2018-12-11 | Stop reason: HOSPADM

## 2017-05-12 RX ORDER — NALBUPHINE HYDROCHLORIDE 10 MG/ML
2.5-5 INJECTION, SOLUTION INTRAMUSCULAR; INTRAVENOUS; SUBCUTANEOUS EVERY 6 HOURS PRN
Status: DISCONTINUED | OUTPATIENT
Start: 2017-05-12 | End: 2017-05-13

## 2017-05-12 RX ORDER — OXYTOCIN 10 [USP'U]/ML
INJECTION, SOLUTION INTRAMUSCULAR; INTRAVENOUS
Status: DISCONTINUED
Start: 2017-05-12 | End: 2017-05-13 | Stop reason: HOSPADM

## 2017-05-12 RX ORDER — CARBOPROST TROMETHAMINE 250 UG/ML
250 INJECTION, SOLUTION INTRAMUSCULAR
Status: DISCONTINUED | OUTPATIENT
Start: 2017-05-12 | End: 2017-05-13

## 2017-05-12 RX ORDER — OXYTOCIN/0.9 % SODIUM CHLORIDE 30/500 ML
PLASTIC BAG, INJECTION (ML) INTRAVENOUS
Status: COMPLETED
Start: 2017-05-12 | End: 2017-05-12

## 2017-05-12 RX ORDER — OXYCODONE AND ACETAMINOPHEN 5; 325 MG/1; MG/1
1 TABLET ORAL
Status: DISCONTINUED | OUTPATIENT
Start: 2017-05-12 | End: 2017-05-13

## 2017-05-12 RX ORDER — SODIUM CHLORIDE, SODIUM LACTATE, POTASSIUM CHLORIDE, CALCIUM CHLORIDE 600; 310; 30; 20 MG/100ML; MG/100ML; MG/100ML; MG/100ML
INJECTION, SOLUTION INTRAVENOUS CONTINUOUS
Status: DISCONTINUED | OUTPATIENT
Start: 2017-05-12 | End: 2017-05-13

## 2017-05-12 RX ORDER — LAMOTRIGINE 25 MG/1
100 TABLET ORAL 2 TIMES DAILY
Status: DISCONTINUED | OUTPATIENT
Start: 2017-05-12 | End: 2017-05-14 | Stop reason: HOSPADM

## 2017-05-12 RX ORDER — LEVETIRACETAM 500 MG/1
1500 TABLET ORAL 2 TIMES DAILY
Status: DISCONTINUED | OUTPATIENT
Start: 2017-05-12 | End: 2017-05-14 | Stop reason: HOSPADM

## 2017-05-12 RX ORDER — NALOXONE HYDROCHLORIDE 0.4 MG/ML
.1-.4 INJECTION, SOLUTION INTRAMUSCULAR; INTRAVENOUS; SUBCUTANEOUS
Status: DISCONTINUED | OUTPATIENT
Start: 2017-05-12 | End: 2017-05-13

## 2017-05-12 RX ORDER — METHYLERGONOVINE MALEATE 0.2 MG/ML
200 INJECTION INTRAVENOUS
Status: DISCONTINUED | OUTPATIENT
Start: 2017-05-12 | End: 2017-05-13

## 2017-05-12 RX ORDER — ONDANSETRON 2 MG/ML
4 INJECTION INTRAMUSCULAR; INTRAVENOUS EVERY 6 HOURS PRN
Status: DISCONTINUED | OUTPATIENT
Start: 2017-05-12 | End: 2017-05-13

## 2017-05-12 RX ORDER — IBUPROFEN 800 MG/1
800 TABLET, FILM COATED ORAL
Status: COMPLETED | OUTPATIENT
Start: 2017-05-12 | End: 2017-05-12

## 2017-05-12 RX ORDER — MISOPROSTOL 100 UG/1
25 TABLET ORAL
Status: DISCONTINUED | OUTPATIENT
Start: 2017-05-12 | End: 2017-05-13

## 2017-05-12 RX ORDER — ACETAMINOPHEN 325 MG/1
650 TABLET ORAL EVERY 4 HOURS PRN
Status: DISCONTINUED | OUTPATIENT
Start: 2017-05-12 | End: 2017-05-13

## 2017-05-12 RX ORDER — EPHEDRINE SULFATE 50 MG/ML
INJECTION, SOLUTION INTRAMUSCULAR; INTRAVENOUS; SUBCUTANEOUS
Status: COMPLETED
Start: 2017-05-12 | End: 2017-05-12

## 2017-05-12 RX ORDER — FOLIC ACID 1 MG/1
2000 TABLET ORAL 2 TIMES DAILY
Status: DISCONTINUED | OUTPATIENT
Start: 2017-05-12 | End: 2017-05-13

## 2017-05-12 RX ORDER — TERBUTALINE SULFATE 1 MG/ML
0.25 INJECTION, SOLUTION SUBCUTANEOUS
Status: DISCONTINUED | OUTPATIENT
Start: 2017-05-12 | End: 2017-05-13

## 2017-05-12 RX ORDER — OXYTOCIN/0.9 % SODIUM CHLORIDE 30/500 ML
1-24 PLASTIC BAG, INJECTION (ML) INTRAVENOUS CONTINUOUS
Status: DISCONTINUED | OUTPATIENT
Start: 2017-05-12 | End: 2017-05-13

## 2017-05-12 RX ORDER — CITRIC ACID/SODIUM CITRATE 334-500MG
30 SOLUTION, ORAL ORAL ONCE
Status: DISCONTINUED | OUTPATIENT
Start: 2017-05-12 | End: 2017-05-13

## 2017-05-12 RX ORDER — LIDOCAINE HYDROCHLORIDE 10 MG/ML
INJECTION, SOLUTION EPIDURAL; INFILTRATION; INTRACAUDAL; PERINEURAL
Status: DISCONTINUED
Start: 2017-05-12 | End: 2017-05-13 | Stop reason: HOSPADM

## 2017-05-12 RX ORDER — EPHEDRINE SULFATE 50 MG/ML
5 INJECTION, SOLUTION INTRAMUSCULAR; INTRAVENOUS; SUBCUTANEOUS
Status: DISCONTINUED | OUTPATIENT
Start: 2017-05-12 | End: 2017-05-13

## 2017-05-12 RX ORDER — OXYTOCIN 10 [USP'U]/ML
10 INJECTION, SOLUTION INTRAMUSCULAR; INTRAVENOUS
Status: DISCONTINUED | OUTPATIENT
Start: 2017-05-12 | End: 2017-05-13

## 2017-05-12 RX ADMIN — Medication: at 16:27

## 2017-05-12 RX ADMIN — FOLIC ACID 2000 MCG: 1 TABLET ORAL at 22:10

## 2017-05-12 RX ADMIN — Medication 3 ML: at 16:34

## 2017-05-12 RX ADMIN — Medication 5 ML: at 16:26

## 2017-05-12 RX ADMIN — Medication 25 MCG: at 12:29

## 2017-05-12 RX ADMIN — Medication 25 MCG: at 15:01

## 2017-05-12 RX ADMIN — Medication 25 MCG: at 10:08

## 2017-05-12 RX ADMIN — LIDOCAINE HYDROCHLORIDE,EPINEPHRINE BITARTRATE 3 ML: 15; .005 INJECTION, SOLUTION EPIDURAL; INFILTRATION; INTRACAUDAL; PERINEURAL at 16:20

## 2017-05-12 RX ADMIN — Medication: at 16:39

## 2017-05-12 RX ADMIN — SODIUM CHLORIDE, POTASSIUM CHLORIDE, SODIUM LACTATE AND CALCIUM CHLORIDE 500 ML: 600; 310; 30; 20 INJECTION, SOLUTION INTRAVENOUS at 20:45

## 2017-05-12 RX ADMIN — Medication 1 MILLI-UNITS/MIN: at 19:23

## 2017-05-12 RX ADMIN — LEVETIRACETAM 1500 MG: 500 TABLET ORAL at 22:11

## 2017-05-12 RX ADMIN — IBUPROFEN 800 MG: 800 TABLET, FILM COATED ORAL at 22:14

## 2017-05-12 RX ADMIN — LAMOTRIGINE 100 MG: 25 TABLET ORAL at 22:11

## 2017-05-12 RX ADMIN — ONDANSETRON 4 MG: 2 INJECTION INTRAMUSCULAR; INTRAVENOUS at 21:09

## 2017-05-12 RX ADMIN — OXYTOCIN-SODIUM CHLORIDE 0.9% IV SOLN 30 UNIT/500ML 340 ML/HR: 30-0.9/5 SOLUTION at 21:27

## 2017-05-12 RX ADMIN — SODIUM CHLORIDE, POTASSIUM CHLORIDE, SODIUM LACTATE AND CALCIUM CHLORIDE 1000 ML: 600; 310; 30; 20 INJECTION, SOLUTION INTRAVENOUS at 15:58

## 2017-05-12 RX ADMIN — OXYTOCIN-SODIUM CHLORIDE 0.9% IV SOLN 30 UNIT/500ML 1 MILLI-UNITS/MIN: 30-0.9/5 SOLUTION at 19:23

## 2017-05-12 ASSESSMENT — ENCOUNTER SYMPTOMS: SEIZURES: 1

## 2017-05-12 NOTE — PLAN OF CARE
Problem: Labor (Cervical Ripen, Induct, Augment) (Adult,Obstetrics,Pediatric)  Goal: Signs and Symptoms of Listed Potential Problems Will be Absent or Manageable (Labor)  Signs and symptoms of listed potential problems will be absent or manageable by discharge/transition of care (reference Labor (Cervical Ripen, Induct, Augment) (Adult,Obstetrics,Pediatric) CPG).  Outcome: Improving  Pt states is comfortable, denies pain. No ctx's, baby reactive. Questions answered, comfort provided. Oral Misol given at 1008. Saline locked. Pads for Seizures precautions in unit; pt declines to be place at the sides of the bed. Pt prefer to take her own meds from home; states have to be the same brand. Meds sent to pharmacy to be verified.  at bedside very supportive.

## 2017-05-12 NOTE — IP AVS SNAPSHOT
[facility]    [address] [facility] [address]    Phone:  [phone]                                       After Visit Summary   5/12/2017    Jill Regan    MRN: [id number]           After Visit Summary Signature Page     I have received my discharge instructions, and my questions have been answered. I have discussed any challenges I see with this plan with the nurse or doctor.    ..........................................................................................................................................  Patient/Patient Representative Signature      ..........................................................................................................................................  Patient Representative Print Name and Relationship to Patient    ..................................................               ................................................  Date                                            Time    ..........................................................................................................................................  Reviewed by Signature/Title    ...................................................              ..............................................  Date                                                            Time

## 2017-05-12 NOTE — ANESTHESIA PROCEDURE NOTES
Epidural Procedure Note    Staff:     Anesthesiologist:  YOSELIN MASON  Location: OB     Procedure start time:  5/12/2017 4:10 PM     Procedure end time:  5/12/2017 4:40 PM   Pre-procedure checklist:   patient identified, IV checked, site marked, risks and benefits discussed, informed consent, monitors and equipment checked, pre-op evaluation, at physician/surgeon's request and post-op pain management      Correct Patient: Yes      Correct Position: Yes      Correct Site: Yes      Correct Procedure: Yes      Correct Laterality:  Yes    Site Marked:  Yes  Procedure:     Procedure:  Epidural catheter    ASA:  2    Diagnosis:  IOL    Position:  Sitting    Sterile Prep: chloraprep, mask, sterile gloves and patient draped      Insertion site:  L3-4    Local skin infiltration:  1% lidocaine    amount (mL):  3    Needle gauge (G):  17    Needle Length (in):  3.5    Block Needle Type:  Touhy    Injection Technique:  LORT saline    SHAKILA at (cm):  5    Attempts:  1    Redirects:  1    Catheter gauge (G):  19    Catheter threaded easily: Yes      Threaded to cm at skin:  10    Threaded in epidural space (cm):  5    Paresthesias:  No    Aspiration negative for Heme or CSF: Yes      Test dose (mL):  3     Local anesthetic:  Lidocaine 1.5% w/ 1:200,000 epinephrine    Test dose time:  16:20    Test dose negative for signs of intravascular, subdural or intrathecal injection: Yes

## 2017-05-12 NOTE — PROGRESS NOTES
OB Progress Note  17    S: Comfortable with Epidural in place, was feeling more with contractions before placement.    O:  Vitals:    17 1645 17 1651 17 1655 17 1659   BP: 119/75 117/60 115/62 113/68   Pulse:       Resp:       Temp:    98  F (36.7  C)   TempSrc:    Oral   SpO2: 100% 99% 99% 99%     Cervix: 3/70/-2/moderate/anterior, membranes stripped with exam  Membranes: Intact, attempted to rupture but membranes are fairly applied to the scalp without any bulge    FHT: 130 bpm, Moderate variability, Accelerations present, No decelerations  Tocometry: q1.5-2 minutes    A/P: 31 year old  at 38w4d by LMP, here for IOL for epilepsy on two anti-epilectics that is exacerbated by sleep deprivation.   1) IOL: s/p miso x 3, no further miso, attempted AROM but difficult as membranes taught with fetal scalp, kelly too frequently for Pitocin, will place orders to start with spacing contractions  2) GBS negative  3) FWB: Cat I tracing, reactive, EFW 7lbs, VTX on exam  4) Seizure disorder/Epilepsy: on keppra and lamictal, last lvl nontherapeutic in 2017. Will repeat levels today. Will avoid sleep deprivation as possible. Patient will follow-up with PCP Neurologist in outpatient, postpartum setting  5) Pain management: Epidural in place     Kassie Kauffman MD

## 2017-05-12 NOTE — IP AVS SNAPSHOT
MRN:[id number]                      After Visit Summary   5/12/2017    Jill Regan    MRN: [id number]           Thank you!     Thank you for choosing [facility] for your care. Our goal is always to provide you with excellent care. Hearing back from our patients is one way we can continue to improve our services. Please take a few minutes to complete the written survey that you may receive in the mail after you visit with us. Thank you!        Patient Information     Date Of Birth          1985        Designated Caregiver       Most Recent Value    Caregiver    Will someone help with your care after discharge? no      About your hospital stay     You were admitted on:  May 12, 2017 You last received care in the:  [facility]    You were discharged on:  May 14, 2017       Who to Call     For medical emergencies, please call 911.  For non-urgent questions about your medical care, please call your primary care provider or clinic, [phone]          Attending Provider     Provider Specialty    Kassie Kauffman MD OB/Gyn       Primary Care Provider Office Phone # Fax #    Jelena GUILLE Paiz [phone]       [facility] [address] [place] [address]        After Care Instructions     Activity       Review discharge instructions            Diet       Resume previous diet            Discharge Instructions - Postpartum visit       Follow-up with Neurology in 1-2 weeks to discuss medication regimen.  Schedule postpartum visit with your provider and return to clinic in 6 weeks.                  Further instructions from your care team       Activity Instructions:   - Vaginal delivery: Nothing in the vagina for 6 weeks, no intercourse for 6 weeks, you are not restricted on other activities, but rest for 1-2 weeks to allow your body to recover from delivery. No driving until you have stopped taking your pain medications. No  in the bathtub, pool or hot tub for 2  weeks.    Call your health care provider if you have any of the following: Fever above 100.4 F; opening or drainage from your incision; soaking a sanitary pad with blood within 1 hour, or you see blood clots larger than a golf ball; malodorous vaginal discharge, severe or worsening pain uncontrolled by your pain medications, nausea and vomiting, severe headaches, changes in vision, calf swelling or pain, shortness of breath, problems coping with sadness, anxiety, or depression.  If you have any concerns about hurting yourself or the baby, call your provider immediately. You are encouraged to call with questions or concerns after you return home.      Postpartum Vaginal Delivery Instructions    Activity       Ask family and friends for help when you need it.    Do not place anything in your vagina for 6 weeks.    You are not restricted on other activities, but take it easy for a few weeks to allow your body to recover from delivery.  You are able to do any activities you feel up to that point.    No driving until you have stopped taking your pain medications (usually two weeks after delivery).     Call your health care provider if you have any of these symptoms:       Increased pain, swelling, redness, or fluid around your stiches from an episiotomy or perineal tear.    A fever above 100.4 F (38 C) with or without chills when placing a thermometer under your tongue.    You soak a sanitary pad with blood within 1 hour, or you see blood clots larger than a golf ball.    Bleeding that lasts more than 6 weeks.    Vaginal discharge that smells bad.    Severe pain, cramping or tenderness in your lower belly area.    A need to urinate more frequently (use the toilet more often), more urgently (use the toilet very quickly), or it burns when you urinate.    Nausea and vomiting.    Redness, swelling or pain around a vein in your leg.    Problems breastfeeding or a red or painful area on your breast.    Chest pain and cough or  "are gasping for air.    Problems coping with sadness, anxiety, or depression.  If you have any concerns about hurting yourself or the baby, call your provider immediately.     You have questions or concerns after you return home.     Keep your hands clean:  Always wash your hands before touching your perineal area and stitches.  This helps reduce your risk of infection.  If your hands aren't dirty, you may use an alcohol hand-rub to clean your hands. Keep your nails clean and short.        Pending Results     No orders found from 5/10/2017 to 2017.            Statement of Approval     Ordered          17 1305  I have reviewed and agree with all the recommendations and orders detailed in this document.  EFFECTIVE NOW     Approved and electronically signed by:  Kassie Kauffman MD             Admission Information     Date & Time Provider Department Dept. Phone    2017 Kassie Kauffman MD [facility] [phone]      Your Vitals Were     Blood Pressure Pulse Temperature Respirations Pulse Oximetry       102/61 85 97.7  F (36.5  C) (Oral) 16 98%       MyChart Information     [employer] lets you send messages to your doctor, view your test results, renew your prescriptions, schedule appointments and more. To sign up, go to [email or web address] . Click on \"Log in\" on the left side of the screen, which will take you to the Welcome page. Then click on \"Sign up Now\" on the right side of the page.     You will be asked to enter the access code listed below, as well as some personal information. Please follow the directions to create your username and password.     Your access code is: [email or web address]  Expires: 2017 12:31 PM     Your access code will  in 90 days. If you need help or a new code, please call your [facility] clinic or [phone].        Care EveryWhere ID     This is your Care EveryWhere ID. This could be used by other organizations to access your [facility] medical records  [id number]   "         Review of your medicines      START taking        Dose / Directions    ibuprofen 600 MG tablet   Commonly known as:  ADVIL/MOTRIN        Dose:  600 mg   Take 1 tablet (600 mg) by mouth every 6 hours as needed for moderate pain   Quantity:  30 tablet   Refills:  1         CONTINUE these medicines which have NOT CHANGED        Dose / Directions    KEPPRA PO        Dose:  1500 mg   Take 1,500 mg by mouth 2 times daily   Refills:  0       LAMICTAL PO        Dose:  100 mg   Take 100 mg by mouth 2 times daily   Refills:  0       PRENATAL VITAMIN PO        Refills:  0         STOP taking     FOLIC ACID PO                Where to get your medicines      These medications were sent to [facility] [place], [place] - [address]  [address] [place] [address], [facility] [address]     Phone:  [phone]     ibuprofen 600 MG tablet                Protect others around you: Learn how to safely use, store and throw away your medicines at www.disposemymeds.org.             Medication List: This is a list of all your medications and when to take them. Check marks below indicate your daily home schedule. Keep this list as a reference.      Medications           Morning Afternoon Evening Bedtime As Needed    ibuprofen 600 MG tablet   Commonly known as:  ADVIL/MOTRIN   Take 1 tablet (600 mg) by mouth every 6 hours as needed for moderate pain   Last time this was given:  800 mg on 5/14/2017 11:52 AM                                KEPPRA PO   Take 1,500 mg by mouth 2 times daily   Last time this was given:  1,500 mg on 5/14/2017 11:53 AM                                LAMICTAL PO   Take 100 mg by mouth 2 times daily   Last time this was given:  100 mg on 5/14/2017 11:54 AM                                PRENATAL VITAMIN PO                                          More Information        After a Vaginal Birth  After having a baby, your body may be very tired. It can take time to recover from a vaginal delivery. You  may stay in the hospital or birth center from 1 to 4 days. In some cases, you may be able to go home the same day.    Right after the delivery  Your temperature and blood pressure will be taken until they are stable. A nurse or other healthcare provider will observe you as you rest. You may have afterbirth pains. These are cramps caused by the uterus shrinking. Sanitary pads are used to soak up the discharge of the uterine lining. To make sure that you aren t bleeding too much, the pad will be checked. And the firmness of your uterus will be checked. To do this, a nurse will gently push down on your stomach. If you had anesthesia, you ll be watched closely until you can feel and move your toes. If you have perineal pain (pain between the vagina and anus), an ice pack can help.  [place] care  While still in the hospital or birth center, you ll learn how to hold and feed your baby. You will also be given instructions on how to care for your baby. This includes bathing and feeding.   Preparing to go home  You may be anxious to go home as soon as possible. Before you and your baby go home, a healthcare provider will check to be sure you are healthy enough to take care of your baby and yourself. You re ready to go home when:    You can walk to the bathroom and use the bathroom without help.    You can eat solid food and swallow pills (if needed).    You have no sign of infection or other health problems, including fever.     You have adequate pain control.    Your bleeding isn't excessive.    You are able to care for your  and are emotionally stable.  Before leaving the hospital or birth center, you ll be given written instructions for home self-care after vaginal delivery. Be sure to follow these instructions carefully. If you have questions or concerns, talk about them now.  If you have stitches  You may have received stitches in the skin near your vagina. The stitches might have closed an episiotomy (an  incision that enlarges the opening of the vagina). Or you may have needed stitches to repair torn skin. Either way, your stitches should dissolve within weeks. Until then, you can help reduce discomfort, aid healing, and reduce your risk of infection by keeping the stitches clean. These tips can help:    Gently wipe from front to back after you urinate or have a bowel movement.    After wiping, spray warm water on the area. Or you can have a sitz bath. This means sitting in a tub with a few inches of water in it. Then pat the area dry or use a hairdryer on a cool setting.    Do not use soap or any solution except water on the area.    You can take a shower unless told not to.    Change sanitary pads at least every 2 to 4 hours.    Place cold or heat packs on the area as directed by your healthcare providers or nurses. Keep a thin towel between the pack and your skin.    Sit on firm seats so the stitches pull less.   follow-up  Schedule a  follow-up exam with your healthcare provider for about 6 weeks after delivery. During this exam, your uterus and vaginal area will be checked. Contact your healthcare provider if you think you or your baby are having any problems.  When to call your healthcare provider  Call your health care provider right away if you have:    A fever of 100.4 F (38.0 C) or higher    Bleeding that requires a new sanitary pad after an hour, or large blood clots    Pain in your vagina that gets worse and isn't relieved with medicine    Swelling, discharge, or increased pain from vaginal tear or episiotomy    Burning, pain, red streaks, or lumpy areas in your breasts that may be accompanied by flu-like symptoms    Cracks, blisters, or blood on your nipples    Burning or pain when you urinate    Nausea or vomiting    Dizziness or fainting    Feelings of extreme sadness or anxiety, or a feeling that you don t want to be with your baby    Abdominal pain that isn t relieved with  medicine    Vaginal discharge that has a bad odor    No bowel movement for 5 days    Painful urination, orinability to control urination    Redness, warmth, or pain in the lower leg    Chest pain     [id number] [employer]. [address] [place] [address], [place] [address]. All rights reserved. This information is not intended as a substitute for professional medical care. Always follow your healthcare professional's instructions.

## 2017-05-12 NOTE — PROGRESS NOTES
OB Progress Note  17    S: Patient resting, is feeling some contractions.  No VB or LOF.  Wondering about plan.    O:  Vitals:    17 0835 17 1008 17 1030 17 1229   BP: 110/58 98/63 107/59 106/60   Pulse: 85      Resp: 20 20  20   Temp: 98.3  F (36.8  C) 98.2  F (36.8  C)  98.1  F (36.7  C)   TempSrc: Oral Oral  Oral     Cervix: 2/70/-2/moderate/anterior, membranes stripped with exam  Membranes: Intact    FHT: 130 bpm, Moderate variability, Accelerations present, No decelerations  Tocometry: q2-5 minutes    A/P: 31 year old  at 38w4d by LMP, here for IOL for epilepsy on two anti-epilectics that is exacerbated by sleep deprivation.   1) IOL: s/p miso x 2, plan one more misoprostol and with favorable exam likely with next check plan Pitocin, AROM with good pattern  2) GBS negative  3) FWB: Cat I tracing, reactive, EFW 7lbs, VTX on exam  4) Seizure disorder/Epilepsy: on keppra and lamictal, last lvl nontherapeutic in 2017. Will repeat levels today. Will avoid sleep deprivation as possible. Patient will follow-up with PCP Neurologist in outpatient, postpartum setting  5) Pain management: Epidural when desired     Kassie Kauffman MD

## 2017-05-12 NOTE — PLAN OF CARE
Problem: Labor (Cervical Ripen, Induct, Augment) (Adult,Obstetrics,Pediatric)  Goal: Signs and Symptoms of Listed Potential Problems Will be Absent or Manageable (Labor)  Signs and symptoms of listed potential problems will be absent or manageable by discharge/transition of care (reference Labor (Cervical Ripen, Induct, Augment) (Adult,Obstetrics,Pediatric) CPG).  Outcome: Improving  [facility]: [facility] Reason for maternal/fetal assessment per patient is Induction Of Labor  Patient is a . Prenatal record reviewed.      Obstetric History       T2      TAB0   SAB1   E0   M0   L2        # Outcome Date GA Lbr Neville/2nd Weight Sex Delivery Anes PTL Lv   4 Current                     3 Term                 Y   2 Term                 Y   1 SAB                         . Medical history:   Past Medical History:   Diagnosis Date     Epilepsy (H)     . Gestational Age 38w4d. VSS. Fetal movement present. Patient denies cramping, backache, vaginal discharge, pelvic pressure, UTI symptoms, GI problems, bloody show, vaginal bleeding, edema, headache, visual disturbances, epigastric or URQ pain, abdominal pain, rupture of membranes. Support persons  present.  Action: EFM applied for monitoring. Uterine assessment none. Fetal assessment: Presumed adequate fetal oxygenation documented (see flow record).   Response: Dr. Quinteros and Dr Hansen informed of pt arrival and status. Plan per provider is start induction with oral Misol. Patient verbalized agreement with plan.

## 2017-05-12 NOTE — ANESTHESIA PREPROCEDURE EVALUATION
Anesthesia Evaluation     . Pt has had prior anesthetic. Type: Regional           ROS/MED HX    ENT/Pulmonary:  - neg pulmonary ROS     Neurologic:     (+)seizures     Cardiovascular:  - neg cardiovascular ROS       METS/Exercise Tolerance:  >4 METS   Hematologic:  - neg hematologic  ROS       Musculoskeletal:  - neg musculoskeletal ROS       GI/Hepatic:     (+) GERD Symptomatic,       Renal/Genitourinary:  - ROS Renal section negative       Endo:  - neg endo ROS       Psychiatric:  - neg psychiatric ROS       Infectious Disease:  - neg infectious disease ROS       Malignancy:      - no malignancy   Other:    (+) Possibly pregnant                    Physical Exam  Normal systems: cardiovascular, pulmonary and dental    Airway   Mallampati: I    Dental     Cardiovascular   Rhythm and rate: regular and normal      Pulmonary    breath sounds clear to auscultation                    Anesthesia Plan      History & Physical Review  History and physical reviewed and following examination; no interval change.    ASA Status:  2 .    NPO Status:  Full stomach    Plan for Epidural with Other induction. Maintenance will be Other.           Postoperative Care  Postoperative pain management:  Neuraxial analgesia.      Consents  Anesthetic plan, risks, benefits and alternatives discussed with:  Patient..
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
## 2017-05-13 LAB
LAMOTRIGINE SERPL-MCNC: 2.7 UG/ML
LEVETIRACETAM SERPL-MCNC: 31 UG/ML

## 2017-05-13 PROCEDURE — 12000030 ZZH R&B OB INTERMEDIATE UMMC

## 2017-05-13 PROCEDURE — 25000132 ZZH RX MED GY IP 250 OP 250 PS 637: Performed by: OBSTETRICS & GYNECOLOGY

## 2017-05-13 RX ORDER — NALOXONE HYDROCHLORIDE 0.4 MG/ML
.1-.4 INJECTION, SOLUTION INTRAMUSCULAR; INTRAVENOUS; SUBCUTANEOUS
Status: DISCONTINUED | OUTPATIENT
Start: 2017-05-13 | End: 2017-05-14 | Stop reason: HOSPADM

## 2017-05-13 RX ORDER — ACETAMINOPHEN 325 MG/1
650 TABLET ORAL EVERY 4 HOURS PRN
Status: DISCONTINUED | OUTPATIENT
Start: 2017-05-13 | End: 2017-05-14 | Stop reason: HOSPADM

## 2017-05-13 RX ORDER — BISACODYL 10 MG
10 SUPPOSITORY, RECTAL RECTAL DAILY PRN
Status: DISCONTINUED | OUTPATIENT
Start: 2017-05-14 | End: 2017-05-14 | Stop reason: HOSPADM

## 2017-05-13 RX ORDER — MISOPROSTOL 200 UG/1
400 TABLET ORAL
Status: DISCONTINUED | OUTPATIENT
Start: 2017-05-13 | End: 2017-05-14 | Stop reason: HOSPADM

## 2017-05-13 RX ORDER — OXYCODONE HYDROCHLORIDE 5 MG/1
5 TABLET ORAL EVERY 6 HOURS PRN
Status: DISCONTINUED | OUTPATIENT
Start: 2017-05-13 | End: 2017-05-14 | Stop reason: HOSPADM

## 2017-05-13 RX ORDER — OXYTOCIN/0.9 % SODIUM CHLORIDE 30/500 ML
340 PLASTIC BAG, INJECTION (ML) INTRAVENOUS CONTINUOUS PRN
Status: DISCONTINUED | OUTPATIENT
Start: 2017-05-13 | End: 2017-05-14 | Stop reason: HOSPADM

## 2017-05-13 RX ORDER — OXYTOCIN/0.9 % SODIUM CHLORIDE 30/500 ML
100 PLASTIC BAG, INJECTION (ML) INTRAVENOUS CONTINUOUS
Status: DISCONTINUED | OUTPATIENT
Start: 2017-05-13 | End: 2017-05-14 | Stop reason: HOSPADM

## 2017-05-13 RX ORDER — OXYTOCIN 10 [USP'U]/ML
10 INJECTION, SOLUTION INTRAMUSCULAR; INTRAVENOUS
Status: DISCONTINUED | OUTPATIENT
Start: 2017-05-13 | End: 2017-05-14 | Stop reason: HOSPADM

## 2017-05-13 RX ORDER — AMOXICILLIN 250 MG
1-2 CAPSULE ORAL 2 TIMES DAILY
Status: DISCONTINUED | OUTPATIENT
Start: 2017-05-13 | End: 2017-05-14 | Stop reason: HOSPADM

## 2017-05-13 RX ORDER — HYDROCORTISONE 2.5 %
CREAM (GRAM) TOPICAL 3 TIMES DAILY PRN
Status: DISCONTINUED | OUTPATIENT
Start: 2017-05-13 | End: 2017-05-14 | Stop reason: HOSPADM

## 2017-05-13 RX ORDER — LANOLIN 100 %
OINTMENT (GRAM) TOPICAL
Status: DISCONTINUED | OUTPATIENT
Start: 2017-05-13 | End: 2017-05-14 | Stop reason: HOSPADM

## 2017-05-13 RX ORDER — IBUPROFEN 400 MG/1
400-800 TABLET, FILM COATED ORAL EVERY 6 HOURS PRN
Status: DISCONTINUED | OUTPATIENT
Start: 2017-05-13 | End: 2017-05-14 | Stop reason: HOSPADM

## 2017-05-13 RX ORDER — FOLIC ACID 1 MG/1
1 TABLET ORAL DAILY
Status: DISCONTINUED | OUTPATIENT
Start: 2017-05-14 | End: 2017-05-14 | Stop reason: HOSPADM

## 2017-05-13 RX ADMIN — IBUPROFEN 800 MG: 400 TABLET ORAL at 16:57

## 2017-05-13 RX ADMIN — IBUPROFEN 800 MG: 400 TABLET ORAL at 23:37

## 2017-05-13 RX ADMIN — SENNOSIDES AND DOCUSATE SODIUM 2 TABLET: 8.6; 5 TABLET ORAL at 23:38

## 2017-05-13 RX ADMIN — LEVETIRACETAM 1500 MG: 500 TABLET ORAL at 23:17

## 2017-05-13 RX ADMIN — SENNOSIDES AND DOCUSATE SODIUM 2 TABLET: 8.6; 5 TABLET ORAL at 10:27

## 2017-05-13 RX ADMIN — IBUPROFEN 800 MG: 400 TABLET ORAL at 10:26

## 2017-05-13 RX ADMIN — LEVETIRACETAM 1500 MG: 500 TABLET ORAL at 10:30

## 2017-05-13 RX ADMIN — LAMOTRIGINE 100 MG: 25 TABLET ORAL at 10:30

## 2017-05-13 RX ADMIN — LAMOTRIGINE 100 MG: 25 TABLET ORAL at 23:16

## 2017-05-13 NOTE — PLAN OF CARE
Problem: Goal Outcome Summary  Goal: Goal Outcome Summary  Outcome: Improving  Pt VSS. Up ad ranjana & indep with cares & baby feedings. Soaked in tub. Pt stable at present.

## 2017-05-13 NOTE — PROGRESS NOTES
[facility]  Obstetrics Postpartum Note    S: Patient doing well this AM.  Pain well controlled.  Tolerating regular diet without nausea or vomiting.  Ambulating without dizziness.  Lochia moderate.  Breastfeeding.  Taking home tablets of keppra and lamictal.    O:   Patient Vitals for the past 24 hrs:   BP Temp Temp src Heart Rate Resp SpO2   17 1000 101/47 97.5  F (36.4  C) Oral 82 18 -   17 0400 101/58 - - 68 16 -   17 0100 104/62 97.6  F (36.4  C) Oral 78 16 98 %   17 2315 95/55 - - - - 99 %   17 2300 101/53 - - - - -   17 2245 109/60 - - - - -   17 2230 117/56 - - - - 99 %   17 2215 121/58 - - - - 98 %   17 2138 104/62 - - - - 98 %   17 2100 117/89 - - - - 98 %   17 2030 110/67 - - - - 100 %   17 1900 106/57 - - - - 98 %   17 1830 108/57 - - - - 98 %   17 1802 104/56 - - - - 98 %   17 1732 107/63 - - - - 99 %   17 1659 113/68 98  F (36.7  C) Oral - - 99 %   17 165 115/62 - - - - 99 %   17 1651 117/60 - - - - 99 %   17 1645 119/75 - - - - 100 %   17 1638 107/60 - - - - 99 %   17 1636 104/59 - - - - 99 %   17 1634 104/59 - - - - 99 %   17 1632 115/67 - - - - 99 %   17 1630 92/55 - - - - 99 %   17 1628 90/52 - - - - 100 %   17 1625 (!) 84/46 - - - - -   17 1624 93/50 - - - - 100 %   17 1622 99/52 - - - - 100 %   17 1542 104/77 - - - - -     I/O last 3 completed shifts:  In: 480.62 [I.V.:480.62]  Out: 913 [Urine:900; Blood:13]    Gen:  Resting comfortably, NAD  CV:  RRR  Pulm:  CTAB, no wheezes  Abd:  Soft, appropriately TTP, non-distended. Fundus at umbilicus, firm.  Ext:  non-tender, min edema bilaterally    Hgb:   Hemoglobin   Date Value Ref Range Status   2017 12.1 11.7 - 15.7 g/dL Final   2017 11.9 11.7 - 15.7 g/dL Final       Assessment/Plan:  31 year old  on PPD #1 s/p  after IOL for  history of epilepsy.    PP: Continue with routine postpartum management   Rh positive, Rubella immune   Infant Nutrition: breastfeeding    Contraception: Did not discuss this AM  Pain: Continue PO meds (oxycodone, tylenol, ibuprofen)  Heme: Hgb NR > QBL 13 mL > 12.1, Iron supplementation not indicated  Neuro: On home dosing lamictal and keppra, plans f/u with Neuro as outpt  FEN/GI: Tolerating regular diet  :   Voiding spontaneously  PPX: Ambulation for DVT ppx, SCDs while resting in bed    Dispo: Anticipate discharge once meeting postop goals, PPD#1-2    Christina Grider MD, MPH  5/13/2017, 3:12 PM

## 2017-05-13 NOTE — PLAN OF CARE
Problem: Goal Outcome Summary  Goal: Goal Outcome Summary  Outcome: Improving  Vaginal Delivery Note   of viable Male with Yann Riggs and Jamari in attendance.  Nursery RN present. Placenta delivered without complication, pitocin running at 340 ml/hr., no laceration, no repair, fransisco cares provided. QBL 13 ml. Fundus firm, midline, U/1 with scant rubra bleeding. Patient denies pain at this time but ibuprofen given at 2214 (see eMAR). Mother and baby in stable condition.

## 2017-05-13 NOTE — PLAN OF CARE
Problem: Goal Outcome Summary  Goal: Goal Outcome Summary  Outcome: Therapy, progress towards functional goals is fair  Patient requested epidural at handoff. MD notified and epidural infusing at 1638. Patient states she feels adequate pain relief. Moderate contractions q 2-3 mins. FHR reactive with baseline 125, LD present but resolved with emptying of bladder. Patient resting comfortably. Reposition q 30-60 mins with peanut ball. Will continue to update provider as necessary.

## 2017-05-13 NOTE — PROGRESS NOTES
[facility]  Labor Progress Note    S: Patient doing well, not feeling contractions or pain. Got a little rest. No pressure or contractions. No leakage of fluid or bleeding.    O:   Patient Vitals for the past 4 hrs:   BP Temp Temp src SpO2   17 1830 108/57 - - 98 %   17 1802 104/56 - - 98 %   17 1732 107/63 - - 99 %   17 1659 113/68 98  F (36.7  C) Oral 99 %   17 1655 115/62 - - 99 %   17 1651 117/60 - - 99 %   17 1645 119/75 - - 100 %   17 1638 107/60 - - 99 %   17 1636 104/59 - - 99 %   17 1634 104/59 - - 99 %   17 1632 115/67 - - 99 %   17 1630 92/55 - - 99 %   17 1628 90/52 - - 100 %   17 1625 (!) 84/46 - - -   17 1624 93/50 - - 100 %   17 1622 99/52 - - 100 %     SVE: 3/70/-2, small amount of bloody show, no bulging bag    FHT: Baseline 125, mod variability, + accelerations, no decelerations  [place]: 3-4 contractions in 10 minutes    A/P: Jill Regan is a 31 year old  at 38w4d, admitted for IOL for history of epilepsy.    Labor:   - S/p cytotec for cervical ripening, contraction about 3-4 in 10. No cervical change over the past few hours; will start pitocin for induction now. Attempted AROM earlier this afternoon with not much fluid. Will AROM later if bulging bag.  - GBS neg, no abx indicated    FWB:   - Category 1 FHT  - EFW 7 lb    Pain management:   - comfortable with epidural      Che Riggs MD  Ob/Gyn, PGY-3  2017, 7:44 PM

## 2017-05-13 NOTE — PLAN OF CARE
Data: Jill Regan transferred to [id number] via wheelchair at 0045. Baby transferred via parent's arms.  Action: Receiving unit notified of transfer: Yes. Patient and family notified of room change. Report given to ELISEO Marshall RN at 0058. Belongings sent to receiving unit. Accompanied by Registered Nurse. Oriented patient to surroundings. Call light within reach. ID bands double-checked with receiving RN.  Response: Patient tolerated transfer and is stable.

## 2017-05-13 NOTE — L&D DELIVERY NOTE
Delivery Summary    Brief Delivery Narrative:  Jill Regan is a 30 yo  at 38w4d who presented for induction of labor given her history of epilepsy per neurology recommendations. She underwent cervical ripening with misoprostol x 3 and made some cervical change. She received an epidural for pain control. She underwent AROM with minimal clear fluid at 1708. She was started on pitocin around 1915 given stable cervical exam and started to feel pressure one hour later. She was found to be completely dilated at 2100. She pushed and spontaneously delivered a viable male infant with Apgars 9 and 9 at 2125. Weight 3260 g. The baby was in CLARITZA position and the shoulders delivered easily. The placenta delivered spontaneously, intact with a 3 vessel cord. There were no perineal or vaginal lacerations. QBL was 13 mL. Fundus was firm after delivery and pitocin was running through the IV. The infant remained with mother for the transition period. The sponge and instrument counts were correct x 2. Dr. Kauffman was present for the entire delivery.    Che Riggs MD  OB/GYN Resident PGY3  Pager [phone]    Jill Regan MRN# [id number]   Age: 31 year old YOB: 1985           Labor Event Times    Labor onset date:  17 Onset time:   3:00 PM   Dilation complete date:  17 Complete time:   9:00 PM   Start pushing date/time:  2017            Labor Length    1st Stage (hrs):  6 (min):  0   2nd Stage (hrs):  0 (min):  25   3rd Stage (hrs):  0 (min):  3      Labor Events     labor?:  No    steroids:  None   Labor Type:  Induction   Predominate monitoring during 1st stage:  continuous electronic fetal monitoring      Antibiotics received during labor?:  No      Rupture identifier:  Rupture 1   Rupture date/time: 17 1708   Rupture type:  Artificial Rupture of Membranes   Fluid color:  Clear, Pink   Fluid odor:  Normal      Induction:  Misoprostol, Oxytocin, AROM  "  Induction date/time:     Cervical ripening date/time: 17 1008         1:1 continuous labor support provided by?:  RN Labor partogram used?:  no         Delivery/Placenta Date and Time    Delivery Date:  17 Delivery Time:   9:25 PM   Placenta Date/Time:  2017  9:28 PM   Oxytocin given at the time of delivery:  after delivery of baby      Vaginal Counts    Initial count performed by 2 team members:   Two Team Members   Daja Riggs          Needles Suture Sultana Sponges Instruments   Initial counts 2 0 5    Added to count 0 0 0    Final counts 2 0 5       Placed during labor Accounted for at the end of labor   No NA   No NA   No NA      Final count performed by 2 team members:   Two Team Members    Daja Riggs         Final count correct?:  Yes         Apgars    Living status:  Yes    1 Minute 5 Minute 10 Minute 15 Minute 20 Minute   Skin color: 1  1       Heart rate: 2  2       Reflex irritability: 2  2       Muscle tone: 2  2       Respiratory effort: 2  2       Total: 9  9          Apgars assigned by:  MIC JOHNSON RN      Cord    Vessels:  3 Vessels Complications:  None   Cord Blood Disposition:  Lab Gases Sent?:  No         [place] Resuscitation    Methods:  None      [place] Care at Delivery:  Infant with spontaneous cry, pink to mother's abdomen. Dried and stimulated for further lusty cry. Infant skin to skin with mother, hat and warm blankets applied.    Output in Delivery Room:  Stool      [place] Measurements    Weight:  115 oz Length:  20\"   Head circumference:  0.349 m       Skin to Skin and Feeding Plan    Skin to skin initiation date/time: 17   Skin to skin with:  Mother   Skin to skin end date/time:     Breastfeeding initiated date/time:  2017   How do you plan to feed your baby:  Breastfeeding      Labor Events and Shoulder Dystocia    Fetal Tracing Prior to Delivery:  Category 1   Shoulder dystocia present?:  Neg          "   Delivery (Maternal) (Provider to Complete) ([id number])    Episiotomy:  None   Perineal lacerations:  None    Vaginal laceration?:  No    Cervical laceration?:  No          Mother's Information  Mother: Jill Regan #[id number]    Start of Mother's Information     IO Blood Loss  05/12/17 1500 - 05/13/17 0413    Mom's I/O Activity            End of Mother's Information  Mother: Jill Regan #[id number]            Delivery - Provider to Complete ([id number])    Delivering clinician:  KASSIE KAUFFMAN   Attempted Delivery Types (Choose all that apply):  Spontaneous Vaginal Delivery   Delivery Type (Choose the 1 that will go to the Birth History):  Vaginal, Spontaneous Delivery                     Other personnel:   Provider Role   BOYD RIGGS Resident            Placenta    Delayed Cord Clamping:  Done   Date/Time:  5/12/2017  9:28 PM   Removal:  Spontaneous   Disposition:  Hospital disposal      Anesthesia    Method:  Epidural   Cervical dilation at placement:  0-3         Presentation and Position    Presentation:  Vertex   Position:  Left Occiput Anterior                    Boyd Riggs MD    Staff MD Note  I was present and scrubbed for the entire procedure noted above.  I agree with the description above and any necessary changes have been made by me.  Kassie Kauffman MD

## 2017-05-13 NOTE — PLAN OF CARE
Problem: Goal Outcome Summary  Goal: Goal Outcome Summary  Outcome: Therapy, progress towards functional goals is fair  Pt transferred from L& D at 0100 per W/C holding infant, IV infusing RT hand. Oriented to room and plan of care . FOB here.Taking oral. IV saline locked. Settled for sleep.

## 2017-05-13 NOTE — DISCHARGE SUMMARY
VAGINAL DELIVERY DISCHARGE SUMMARY    Admit date: 2017  Discharge date: 2017     Admit Dx:   - 31 year old  at 38w4d  - Epilepsy on keppra and lamictal, last seizure 3 years ago  - GBS negative status  - Rh positive status    Discharge Dx:  - Same as above , s/p     Procedures:  - Spontaneous vaginal delivery  - Epidural analgesia    Admit HPI:  Jill Regan is a 30 yo  at 38w4d who presented for induction of labor given her history of epilepsy per neurology recommendations.  Please see her admit H&P for full details of her PMH, PSH, Meds, Allergies and exam on admit.    Hospital course:  She underwent cervical ripening with misoprostol x 3 and made some cervical change. She received an epidural for pain control. She underwent AROM with minimal clear fluid at 1708. She was started on pitocin around 1915 given stable cervical exam and started to feel pressure one hour later. She was found to be completely dilated at 2100. She pushed and spontaneously delivered a viable male infant with Apgars 9 and 9 at 2125. The baby was in CLARITZA position and the shoulders delivered easily. The placenta delivered spontaneously, intact with a 3 vessel cord. There were no perineal or vaginal lacerations. QBL was 13 mL. Weight was 3260 g. Fundus was firm after delivery and pitocin was running through the IV. The infant remained with mother for the transition period. The sponge and instrument counts were correct x 2. Dr. Kauffman was present for the entire delivery. Please see her Delivery Summary for full details regarding her delivery.QBL 13    Her postpartum course was uncomplicated. On PPD#2, she was meeting all of her postpartum goals and deemed stable for discharge. She was voiding without difficulty, tolerating a regular diet without nausea and vomiting, her pain was well controlled on oral pain medicines and her lochia was appropriate. Her hemoglobin prior to delivery was 12.1 . Her Rh status was  positive, and Rhogam was not indicated. At the time of discharge, she was Breastfeeding her infant and desired vasectomy for contraception.    Discharge/Disposition:  Ms. Jill Regan was discharged to home in stable condition with the following instructions/medications:  - Call for temperature > 100.4, foul smelling vaginal discharge, bleeding > 1 pad per hour x 2 hrs, pain not controlled by oral pain meds, severe constipation or severe nausea or vomiting.  - She was instructed to follow-up with her primary OB in 6 weeks for a routine postpartum visit.  - She was instructed to continue her PNV on discharge if she wished to breast feed her infant.  - She was discharged home with the following medications:    Jill Regan   Home Medication Instructions [place]:[id number]    Printed on:05/14/17 2993   Medication Information                      FOLIC ACID PO  Take 2,000 mcg by mouth 2 times daily             ibuprofen (ADVIL/MOTRIN) 600 MG tablet  Take 1 tablet (600 mg) by mouth every 6 hours as needed for moderate pain             LamoTRIgine (LAMICTAL PO)  Take 100 mg by mouth 2 times daily             LevETIRAcetam (KEPPRA PO)  Take 1,500 mg by mouth 2 times daily             Prenatal Vit-Fe Fumarate-FA (PRENATAL VITAMIN PO)                 Kassie Kauffman MD

## 2017-05-14 VITALS
SYSTOLIC BLOOD PRESSURE: 102 MMHG | DIASTOLIC BLOOD PRESSURE: 61 MMHG | TEMPERATURE: 97.7 F | OXYGEN SATURATION: 98 % | RESPIRATION RATE: 16 BRPM | HEART RATE: 85 BPM

## 2017-05-14 PROCEDURE — 25000132 ZZH RX MED GY IP 250 OP 250 PS 637: Performed by: OBSTETRICS & GYNECOLOGY

## 2017-05-14 RX ORDER — IBUPROFEN 600 MG/1
600 TABLET, FILM COATED ORAL EVERY 6 HOURS PRN
Qty: 30 TABLET | Refills: 1 | Status: SHIPPED | OUTPATIENT
Start: 2017-05-14 | End: 2018-07-20

## 2017-05-14 RX ADMIN — ACETAMINOPHEN 650 MG: 325 TABLET, FILM COATED ORAL at 12:38

## 2017-05-14 RX ADMIN — IBUPROFEN 800 MG: 400 TABLET ORAL at 11:52

## 2017-05-14 RX ADMIN — SENNOSIDES AND DOCUSATE SODIUM 1 TABLET: 8.6; 5 TABLET ORAL at 11:52

## 2017-05-14 RX ADMIN — LEVETIRACETAM 1500 MG: 500 TABLET ORAL at 11:53

## 2017-05-14 RX ADMIN — LAMOTRIGINE 100 MG: 25 TABLET ORAL at 11:54

## 2017-05-14 NOTE — PLAN OF CARE
Problem: Goal Outcome Summary  Goal: Goal Outcome Summary  Outcome: Improving  Data: Vital signs within normal limits. Postpartum checks within normal limits - see flow record. Patient eating and drinking normally. Patient able to empty bladder independently and is up ambulating. No apparent signs of infection. perineum healing well. Patient performing self cares and is able to care for infant. Breastfeeding independently on que.  Action: Patient medicated during the shift for cramping. See MAR. Patient reassessed within 1 hour after each medication and pain was improved - patient stated she was comfortable. Patient education done about infant screenings, discharge criteria, pain management and plan of care. See flow record.  Response: Positive attachment behaviors observed with infant. Support persons present.   Plan: Anticipate discharge on 5/14.

## 2017-05-14 NOTE — DISCHARGE INSTRUCTIONS
Activity Instructions:   - Vaginal delivery: Nothing in the vagina for 6 weeks, no intercourse for 6 weeks, you are not restricted on other activities, but rest for 1-2 weeks to allow your body to recover from delivery. No driving until you have stopped taking your pain medications. No  in the bathtub, pool or hot tub for 2 weeks.    Call your health care provider if you have any of the following: Fever above 100.4 F; opening or drainage from your incision; soaking a sanitary pad with blood within 1 hour, or you see blood clots larger than a golf ball; malodorous vaginal discharge, severe or worsening pain uncontrolled by your pain medications, nausea and vomiting, severe headaches, changes in vision, calf swelling or pain, shortness of breath, problems coping with sadness, anxiety, or depression.  If you have any concerns about hurting yourself or the baby, call your provider immediately. You are encouraged to call with questions or concerns after you return home.      Postpartum Vaginal Delivery Instructions    Activity       Ask family and friends for help when you need it.    Do not place anything in your vagina for 6 weeks.    You are not restricted on other activities, but take it easy for a few weeks to allow your body to recover from delivery.  You are able to do any activities you feel up to that point.    No driving until you have stopped taking your pain medications (usually two weeks after delivery).     Call your health care provider if you have any of these symptoms:       Increased pain, swelling, redness, or fluid around your stiches from an episiotomy or perineal tear.    A fever above 100.4 F (38 C) with or without chills when placing a thermometer under your tongue.    You soak a sanitary pad with blood within 1 hour, or you see blood clots larger than a golf ball.    Bleeding that lasts more than 6 weeks.    Vaginal discharge that smells bad.    Severe pain, cramping or tenderness in your lower  belly area.    A need to urinate more frequently (use the toilet more often), more urgently (use the toilet very quickly), or it burns when you urinate.    Nausea and vomiting.    Redness, swelling or pain around a vein in your leg.    Problems breastfeeding or a red or painful area on your breast.    Chest pain and cough or are gasping for air.    Problems coping with sadness, anxiety, or depression.  If you have any concerns about hurting yourself or the baby, call your provider immediately.     You have questions or concerns after you return home.     Keep your hands clean:  Always wash your hands before touching your perineal area and stitches.  This helps reduce your risk of infection.  If your hands aren't dirty, you may use an alcohol hand-rub to clean your hands. Keep your nails clean and short.

## 2017-05-14 NOTE — PLAN OF CARE
Problem: Postpartum, Vaginal Delivery (Adult)  Goal: Signs and Symptoms of Listed Potential Problems Will be Absent or Manageable (Postpartum, Vaginal Delivery)  Signs and symptoms of listed potential problems will be absent or manageable by discharge/transition of care (reference Postpartum, Vaginal Delivery (Adult) CPG).   Outcome: Improving  Data: Vital signs within normal limits. Postpartum checks within normal limits - see flow record. Patient eating and drinking normally. Patient able to empty bladder independently and is up ambulating. No apparent signs of infection. Perineum healing well, no laceraton. Patient performing self cares and is able to care for infant. Complaining of constipation.  Action: Patient medicated during the shift for pain and cramping. See MAR. Patient reassessed within 1 hour after each medication and pain was improved - patient stated she was comfortable. Patient education done. See flow record. MD notified for medication for constipation.  Response: Positive attachment behaviors observed with infant. Support persons present.   Plan: Anticipate discharge on 5/14/17.

## 2017-05-14 NOTE — PROGRESS NOTES
[facility]  Obstetrics Postpartum Note    S: Doing well.  Having some cramping, more than with last 2 pregnancies, and more intense with feeding.  Minimal lochia.  Tolerating regular diet.  Urinating without issues.  Breastfeeding going well.  Plans discharge home today.    O:   Patient Vitals for the past 24 hrs:   BP Temp Temp src Heart Rate Resp   17 1155 102/61 97.7  F (36.5  C) Oral 68 16   17 2345 (!) 84/53 97.7  F (36.5  C) Oral 71 16   17 1742 107/73 98.1  F (36.7  C) Oral 66 16        Gen:  Resting comfortably, NAD  CV:  RRR  Pulm:  CTAB, no wheezes  Abd:  Soft, appropriately TTP, non-distended. FF @ U-1.  Ext:  non-tender, min edema bilaterally    Hgb:   Hemoglobin   Date Value Ref Range Status   2017 12.1 11.7 - 15.7 g/dL Final   2017 11.9 11.7 - 15.7 g/dL Final       Assessment/Plan:  31 year old  on PPD #1 s/p  after IOL for history of epilepsy.    PP: Continue with routine postpartum management   Rh positive, Rubella immune   Infant Nutrition: breastfeeding    Contraception: Vasectomy  Pain: Continue PO meds (oxycodone, tylenol, ibuprofen)  Heme: Hgb NR > QBL 13 mL > 12.1, Iron supplementation not indicated  Neuro: On home dosing lamictal and keppra, plans f/u with Neuro as outpt  FEN/GI: Tolerating regular diet  :   Voiding spontaneously  PPX: Ambulation for DVT ppx, SCDs while resting in bed    Dispo: Discharge home today    Kassie Kauffman MD

## 2017-05-14 NOTE — PLAN OF CARE
Problem: Postpartum, Vaginal Delivery (Adult)  Goal: Signs and Symptoms of Listed Potential Problems Will be Absent or Manageable (Postpartum, Vaginal Delivery)  Signs and symptoms of listed potential problems will be absent or manageable by discharge/transition of care (reference Postpartum, Vaginal Delivery (Adult) CPG).   Outcome: Completed Date Met:  05/14/17  Data: Vital signs within normal limits. Postpartum checks within normal limits - see flow record. Patient eating and drinking normally. Patient able to empty bladder independently and is up ambulating. No apparent signs of infection. No laceration. Patient performing self cares and is able to care for infant.  Action: Patient medicated during the shift for pain and cramping. See MAR. Patient reassessed within 1 hour after each medication and pain was improved - patient stated she was comfortable. Patient education done. See flow record. Discharge instructions and home medications reviewed with pt. Questions answered.  Response: Positive attachment behaviors observed with infant. Support persons present.   Plan: Discharged @ 1305 with infant. Patient to remain in room with infant until spouse returns with car seat. Patient left via patient transport, accompanied by spouse and infant at 1620.

## 2018-07-20 ENCOUNTER — OFFICE VISIT (OUTPATIENT)
Dept: OBGYN | Facility: CLINIC | Age: 33
End: 2018-07-20
Payer: COMMERCIAL

## 2018-07-20 VITALS
BODY MASS INDEX: 21.68 KG/M2 | HEIGHT: 66 IN | DIASTOLIC BLOOD PRESSURE: 70 MMHG | WEIGHT: 134.9 LBS | HEART RATE: 79 BPM | SYSTOLIC BLOOD PRESSURE: 115 MMHG

## 2018-07-20 DIAGNOSIS — Z01.411 ENCOUNTER FOR GYNECOLOGICAL EXAMINATION WITH ABNORMAL FINDING: Primary | ICD-10-CM

## 2018-07-20 DIAGNOSIS — Z12.4 PAP SMEAR FOR CERVICAL CANCER SCREENING: ICD-10-CM

## 2018-07-20 DIAGNOSIS — Z00.00 ROUTINE GENERAL MEDICAL EXAMINATION AT A HEALTH CARE FACILITY: ICD-10-CM

## 2018-07-20 DIAGNOSIS — I86.3 VARICOSE VEINS OF VULVA AND PERINEUM: ICD-10-CM

## 2018-07-20 PROCEDURE — G0463 HOSPITAL OUTPT CLINIC VISIT: HCPCS

## 2018-07-20 PROCEDURE — G0145 SCR C/V CYTO,THINLAYER,RESCR: HCPCS | Performed by: STUDENT IN AN ORGANIZED HEALTH CARE EDUCATION/TRAINING PROGRAM

## 2018-07-20 PROCEDURE — G0476 HPV COMBO ASSAY CA SCREEN: HCPCS | Performed by: STUDENT IN AN ORGANIZED HEALTH CARE EDUCATION/TRAINING PROGRAM

## 2018-07-20 ASSESSMENT — PAIN SCALES - GENERAL: PAINLEVEL: NO PAIN (0)

## 2018-07-20 NOTE — PROGRESS NOTES
Progress Note    SUBJECTIVE:  Jill Regan is an 33 year old  , who presents for a breast and pelvic exam. She is accompanied by her 14 month old toddler. She has history of vulvar varicosities in pregnancy and is concerned that they did not resolve following her last delivery. She notes fullness and pain when she has been on her feet for prolonged periods of time and left labia majora swelling at baseline. She otherwise is doing well. She denies dyspareunia or any other complaints. She feels she is coping well with the stress of her 's residency and an upcoming move to [place].  She is breastfeeding her 14 month old without difficulty.     Menstrual History:  No flowsheet data found.   Has not resumed menses since last delivery.     Last  No results found for: PAP  History of abnormal Pap smear: Abnormal Pap in  at age 20. No abnormals since.     Last No results found for: HPV16  Last No results found for: HPV18  Last No results found for: HRHPV      HISTORY:  Prescription Medications as of 2018             LamoTRIgine (LAMICTAL PO) Take 150 mg by mouth 2 times daily       Facility Administered Medications as of 2018             bupivacaine (MARCAINE) 0.125 % injection (diluted from stock concentration by MD or CRNA) by EPIDURAL route as needed    lidocaine-EPINEPHrine 1.5 %-1:562241 injection by EPIDURAL route as needed        No Known Allergies  Immunization History   Administered Date(s) Administered     Influenza Vaccine IM 3yrs+ 4 Valent IIV4 2016       Obstetric History       T3      L3     SAB1   TAB0   Ectopic0   Multiple0   Live Births3      Past Medical History:   Diagnosis Date     Epilepsy (H)      Past Surgical History:   Procedure Laterality Date     labia minora surgery      reduction     wisdom teeth       Family History   Problem Relation Age of Onset     Hyperlipidemia Maternal Grandmother      Diabetes Paternal Grandfather      Skin Cancer  "Mother      Social History     Social History     Marital status:      Spouse name: N/A     Number of children: 2     Years of education: N/A     Social History Main Topics     Smoking status: Never Smoker     Smokeless tobacco: Never Used     Alcohol use No     Drug use: No     Sexual activity: Yes     Partners: Male     Other Topics Concern     None     Social History Narrative       ROS    EXAM:  Blood pressure 115/70, pulse 79, height 1.676 m (5' 6\"), weight 61.2 kg (134 lb 14.4 oz), unknown if currently breastfeeding. Body mass index is 21.77 kg/(m^2).  General appearance: Pleasant female in no acute distress.     BREAST EXAM:  Breast: Without visible skin changes. No dimpling or lesions seen.   Breasts supple, non-tender with palpation, no dominant mass or nodularity noted bilaterally. Axillary nodes negative.      PELVIC EXAM:  EG/BUS: Left vulvar varicosities, otherwise normal genital architecture, no erythema or abnormal secretions Bartholin's, Urethra, [place]'s normal   Urethral meatus: normal   Urethra: no masses, tenderness, or scarring   Bladder: no masses or tenderness   Vagina: moist, pink, rugae with creamy, white, odorless  secretions  Cervix: no lesions  Uterus: anteverted,  and small, smooth, firm, mobile w/o pain  Adnexa: Within normal limits and No masses, nodularity, tenderness      ASSESSMENT:  Encounter Diagnoses   Name Primary?     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     PLAN:   Orders Placed This Encounter   Procedures     Obtaining, preparing and conveyance of cervical or vaginal smear to laboratory.     Pap imaged thin layer screen with HPV - recommended age 30 - 65 years (select HPV order below)     IR REFERRAL     Referral to IR and vein clinic for vulvar varicosities. Patient moving to [place] in 6 " weeks so will attempt to expedite appointment.    Pap with co-testing performed.      Verbalized understanding and agreement with visit plan.    Discussed with Dr. Pukite Alicia Myhre, DO  OB/GYN, PGY-2    The Patient was seen in [facility] by MYHRE, ALICIA.  I reviewed the history & exam. Assessment and plan were jointly made.    Ana Paula Hooks MD

## 2018-07-20 NOTE — LETTER
7/27/2018         Jill Regan   [address] [facility] [address]        Dear Ms. Regan:    I am happy to inform you that your recent cervical cancer screening test (PAP smear) was normal.      Preventative screening such as this helps ensure your health for years to come.  This test should be repeated in 5 years unless otherwise directed by your provider.  Please return in 1 year for your next annual pelvic and breast exam.    If you have any questions or concerns, please call the clinic at [phone].      Sincerely,    Jill Espinoza MD

## 2018-07-20 NOTE — MR AVS SNAPSHOT
After Visit Summary   7/20/2018    Jill Regan    MRN: [id number]           Patient Information     Date Of Birth          1985        Visit Information        Provider Department      7/20/2018 3:15 PM Myhre, Alicia, DO [facility]        Today's Diagnoses     Varicose veins of vulva and perineum    -  1    Pap smear for cervical cancer screening        Routine general medical examination at a health care facility           Follow-ups after your visit        Additional Services     IR REFERRAL       If you have not heard about when your appointment will be or if you have questions about this referral,  please call: [facility] Locations: [phone] or [facility] (Adult or Peds) Locations: [phone]    Please be aware that coverage of these services is subject to the terms and limitations of your health insurance plan.  Call member services at your health plan with any benefit or coverage questions.      Please bring the following with you to your appointment:    (1) Any X-Rays, CTs or MRIs which have been performed.  Contact the facility where they were done to arrange for  prior to your scheduled appointment.    (2) List of current medications   (3) This referral request   (4) Any documents/labs given to you for this referral                    Who to contact     Please call your clinic at [phone] to:    Ask questions about your health    Make or cancel appointments    Discuss your medicines    Learn about your test results    Speak to your doctor            Additional Information About Your Visit        MyChart Information     [employer] is an electronic gateway that provides easy, online access to your medical records. With [employer], you can request a clinic appointment, read your test results, renew a prescription or communicate with your care team.     To sign up for [employer] visit the  "website at [email or web address]   You will be asked to enter the access code listed below, as well as some personal information. Please follow the directions to create your username and password.     Your access code is: [email or web address]  Expires: 10/7/2018  6:31 AM     Your access code will  in 90 days. If you need help or a new code, please contact your [facility] or call [phone] for assistance.        Care EveryWhere ID     This is your Care EveryWhere ID. This could be used by other organizations to access your [facility] medical records  [id number]        Your Vitals Were     Pulse Height BMI (Body Mass Index)             79 1.676 m (5' 6\") 21.77 kg/m2          Blood Pressure from Last 3 Encounters:   18 115/70   17 102/61   17 100/62    Weight from Last 3 Encounters:   18 61.2 kg (134 lb 14.4 oz)   17 72.5 kg (159 lb 14.4 oz)   17 70.9 kg (156 lb 4.8 oz)              We Performed the Following     IR REFERRAL     Obtaining, preparing and conveyance of cervical or vaginal smear to laboratory.     Pap imaged thin layer screen with HPV - recommended age 30 - 65 years (select HPV order below)          Today's Medication Changes          These changes are accurate as of 18  3:51 PM.  If you have any questions, ask your nurse or doctor.               Stop taking these medicines if you haven't already. Please contact your care team if you have questions.     ibuprofen 600 MG tablet   Commonly known as:  ADVIL/MOTRIN   Stopped by:  Myhre, Yvette, DO           KEPPRA PO   Stopped by:  Tomekare, Yvette, DO           PRENATAL VITAMIN PO   Stopped by:  Myhre, Yvette, DO                    Primary Care Provider Office Phone # Fax #    Jelena Paiz [phone]       [facility] [address]  [place] [address]        Equal Access to Services     MICHAEL ENGLE AH: loyda Vargas " laura brunomatorrie herreragilmahugo brownleefinesse tamikomarin feng. So [facility] [phone].    ATENCIÓN: Si rosa paige, tiene a jaimes disposición servicios gratuitos de asistencia lingüística. Llame al [phone].    We comply with applicable federal civil rights laws and [place] laws. We do not discriminate on the basis of race, color, national origin, age, disability, sex, sexual orientation, or gender identity.            Thank you!     Thank you for choosing [facility]  for your care. Our goal is always to provide you with excellent care. Hearing back from our patients is one way we can continue to improve our services. Please take a few minutes to complete the written survey that you may receive in the mail after your visit with us. Thank you!             Your Updated Medication List - Protect others around you: Learn how to safely use, store and throw away your medicines at www.disposemymeds.org.          This list is accurate as of 7/20/18  3:51 PM.  Always use your most recent med list.                   Brand Name Dispense Instructions for use Diagnosis    LAMICTAL PO      Take 150 mg by mouth 2 times daily

## 2018-07-25 LAB
COPATH REPORT: NORMAL
PAP: NORMAL

## 2018-07-26 LAB
FINAL DIAGNOSIS: NORMAL
HPV HR 12 DNA CVX QL NAA+PROBE: NEGATIVE
HPV16 DNA SPEC QL NAA+PROBE: NEGATIVE
HPV18 DNA SPEC QL NAA+PROBE: NEGATIVE
SPECIMEN DESCRIPTION: NORMAL
SPECIMEN SOURCE CVX/VAG CYTO: NORMAL

## 2018-07-27 ENCOUNTER — TELEPHONE (OUTPATIENT)
Dept: OBGYN | Facility: CLINIC | Age: 33
End: 2018-07-27

## 2018-07-27 NOTE — TELEPHONE ENCOUNTER
----- Message from CARY Moya sent at 7/26/2018 12:02 PM CDT -----  Regarding: RE: Vein clinic   Carmella  She would not be able to get this taken care of in the next 6 weeks.  She needs to do nonintervention management first.  Such as wearing spanx of compression bike short for 3 months first.  Then if she does not have improvement in her symptoms she might be eligible for treatment for the varicosities.  Veins are not a quick fix.  Penny  ----- Message -----     From: Myhre, Alicia, DO     Sent: 7/20/2018   3:58 PM       To: CARY Moya  Subject: Vein clinic                                      Srikanth Francis,     I saw Jill Regan in clinic today and she would like to set up an appointment with Dr. Valero's vein clinic for vulvar varicosities . Jill is moving to [place] in 6 weeks. Is it possible to fit her in?     Thank you,     Alicia Myhre

## 2024-03-11 NOTE — PROGRESS NOTES
31 year old female, , presents at 29 2/7 weeks for obstetric ultrasound assessment indicated by h/o seizure disorder.    Single fetus    Presentation - cephalic    USEGA = 29 6/7 weeks.  EFW = 1,516 grams, 63 % for 29 weeks.      YARITZA = 11.7cm.  FHR = 138bpm     Placenta anterior and grade 0    Comments: Appropriate interval growth    Findings discussed with patient.    Further studies as clinically indicated.        MIRTA Wallace MD       [[id number]]